# Patient Record
Sex: FEMALE | Race: WHITE | Employment: OTHER | URBAN - NONMETROPOLITAN AREA
[De-identification: names, ages, dates, MRNs, and addresses within clinical notes are randomized per-mention and may not be internally consistent; named-entity substitution may affect disease eponyms.]

---

## 2020-11-02 ENCOUNTER — TELEPHONE (OUTPATIENT)
Dept: FAMILY MEDICINE CLINIC | Age: 65
End: 2020-11-02

## 2020-11-02 NOTE — TELEPHONE ENCOUNTER
Patient called stating her arms are itching and would like you to call in a betamethasone cream, she states it works and Dr Deangelo Lemus use to give it to her. Patient was asked if she had tried any OTC meds like benadryl and she stated she did not want to drug herself up and she knew this cream worked.     LAST OV was 2/2020 for a sick visit

## 2020-11-05 RX ORDER — BETAMETHASONE DIPROPIONATE 0.5 MG/G
CREAM TOPICAL 2 TIMES DAILY
Qty: 90 G | Refills: 2 | Status: SHIPPED | OUTPATIENT
Start: 2020-11-05

## 2020-11-06 ENCOUNTER — OFFICE VISIT (OUTPATIENT)
Dept: FAMILY MEDICINE CLINIC | Age: 65
End: 2020-11-06
Payer: COMMERCIAL

## 2020-11-06 VITALS
OXYGEN SATURATION: 97 % | HEART RATE: 81 BPM | TEMPERATURE: 97.7 F | BODY MASS INDEX: 21.9 KG/M2 | WEIGHT: 116 LBS | HEIGHT: 61 IN

## 2020-11-06 DIAGNOSIS — Z20.822 ENCOUNTER FOR LABORATORY TESTING FOR COVID-19 VIRUS: ICD-10-CM

## 2020-11-06 DIAGNOSIS — R09.81 NASAL CONGESTION: Primary | ICD-10-CM

## 2020-11-06 PROCEDURE — 99213 OFFICE O/P EST LOW 20 MIN: CPT | Performed by: NURSE PRACTITIONER

## 2020-11-06 RX ORDER — ESTRADIOL 0.04 MG/D
FILM, EXTENDED RELEASE TRANSDERMAL
COMMUNITY
Start: 2020-08-25 | End: 2020-12-21

## 2020-11-06 RX ORDER — FLUTICASONE PROPIONATE 50 MCG
SPRAY, SUSPENSION (ML) NASAL
Qty: 1 BOTTLE | Refills: 5 | Status: SHIPPED | OUTPATIENT
Start: 2020-11-06 | End: 2022-08-05 | Stop reason: ALTCHOICE

## 2020-11-06 RX ORDER — CETIRIZINE HCL 10 MG
10 TABLET ORAL
Qty: 30 TAB | Refills: 0 | Status: SHIPPED | OUTPATIENT
Start: 2020-11-06 | End: 2022-08-05 | Stop reason: ALTCHOICE

## 2020-11-06 RX ORDER — METHYLPREDNISOLONE 4 MG
TABLET, DOSE PACK ORAL
COMMUNITY
End: 2022-04-12

## 2020-11-06 RX ORDER — CALCIUM CARB/VITAMIN D3/VIT K1 500-500-40
TABLET,CHEWABLE ORAL
COMMUNITY
End: 2022-08-05 | Stop reason: ALTCHOICE

## 2020-11-06 NOTE — PROGRESS NOTES
HPI    Bessie Valente is a 76 y.o. female and presents today for Headache and Sinus Infection  Patient states that she's traveled to [place] and [place] recently; states she was around her sister (without a mask) who was sick with a hacking cough(sister works at [employer]); she endorses minimal cough, headache, ?sinus pressure, nasal congestion and an overall unwell feeling. Recent Travel Screening and Travel History documentation   Travel Screening     Question   Response    In the last month, have you been in contact with someone who was confirmed or suspected to have Coronavirus / COVID-19? No / Unsure    Have you had a COVID-19 viral test in the last 14 days? No    Do you have any of the following new or worsening symptoms? Have you traveled internationally in the last month? No      Travel History   Travel since 10/06/20     No documented travel since 10/06/20         Screening  phq neg    Allergies    Allergies   Allergen Reactions    Flagyl [Metronidazole] Diarrhea and Nausea and Vomiting        Medications    Current Outpatient Medications   Medication Sig Dispense    estradioL (VIVELLE) 0.0375 mg/24 hr      DOXYLAMINE SUCCINATE PO Take 1 Tab by mouth nightly.  MV,Ca,Ir,Mn-FA-Cho-Gel-Joaquín-Pab (Body, Hair, Skin and Nails) 3-133 mg-mcg cap Body, Hair, Skin and Nails   daily     cholecalciferol, vitamin d3, (Vitamin D3) 10 mcg (400 unit) cap Vitamin D3   5,000 IU daily     fluticasone propionate (FLONASE) 50 mcg/actuation nasal spray 2 sprays each nostril everyday 1 Bottle    cetirizine (ZYRTEC) 10 mg tablet Take 1 Tab by mouth nightly. 30 Tab    betamethasone dipropionate (DIPROSONE) 0.05 % topical cream Apply  to affected area two (2) times a day. 90 g    [place] Thyroid 15 mg tablet TAKE 3 TABLETS DAILY 90 Tab     No current facility-administered medications for this visit. Problem List    There are no active problems to display for this patient.        Vitals    Visit Vitals  Pulse 81   Temp 97.7 °F (36.5 °C)   Ht 5' 1\" (1.549 m)   Wt 116 lb (52.6 kg)   SpO2 97%   BMI 21.92 kg/m²        ROS    Review of Systems   Constitutional: Positive for malaise/fatigue. Negative for chills. HENT: Positive for congestion. Negative for ear pain, sinus pain and sore throat. Eyes: Negative for blurred vision. Respiratory: Positive for cough (minimal). Negative for sputum production, shortness of breath and wheezing. Cardiovascular: Negative for chest pain. Gastrointestinal: Negative for diarrhea, nausea and vomiting. Musculoskeletal: Negative for myalgias. Skin: Negative for rash. Neurological: Positive for headaches. Negative for dizziness and weakness. Endo/Heme/Allergies: Negative for environmental allergies. Physical Exam    Physical Exam  Vitals signs and nursing note reviewed. Constitutional:       General: She is awake. She is not in acute distress. Appearance: Normal appearance. She is not ill-appearing or toxic-appearing. HENT:      Head: Normocephalic and atraumatic. Right Ear: Tympanic membrane, ear canal and external ear normal. There is impacted cerumen. Left Ear: Tympanic membrane, ear canal and external ear normal. There is no impacted cerumen. Nose: Rhinorrhea present. No congestion. Right Turbinates: Pale. Left Turbinates: Pale. Right Sinus: No maxillary sinus tenderness or frontal sinus tenderness. Left Sinus: No maxillary sinus tenderness or frontal sinus tenderness. Mouth/Throat:      Mouth: Mucous membranes are moist.      Pharynx: Oropharynx is clear. No posterior oropharyngeal erythema. Tonsils: No tonsillar exudate. Eyes:      General:         Right eye: No discharge. Left eye: No discharge. Extraocular Movements: Extraocular movements intact. Neck:      Musculoskeletal: Normal range of motion. Cardiovascular:      Rate and Rhythm: Normal rate and regular rhythm. Pulmonary:      Effort: Pulmonary effort is normal. No respiratory distress. Breath sounds: Normal breath sounds. No wheezing or rhonchi. Musculoskeletal: Normal range of motion. Lymphadenopathy:      Cervical: No cervical adenopathy. Skin:     Findings: No rash. Neurological:      General: No focal deficit present. Mental Status: She is alert and oriented to person, place, and time. Psychiatric:         Behavior: Behavior normal.          Assessment/Plan  1. Nasal congestion  I do not think her exam warrants abx at this time; will try flonase and zyrtec as initial tx; for worsening symptoms, call back to my office and we can consider trial of abx  - fluticasone propionate (FLONASE) 50 mcg/actuation nasal spray; 2 sprays each nostril everyday  Dispense: 1 Bottle; Refill: 5  - cetirizine (ZYRTEC) 10 mg tablet; Take 1 Tab by mouth nightly. Dispense: 30 Tab; Refill: 0    2. Encounter for laboratory testing for COVID-19 virus  covid is a possibility given her exposure and recent travel; will check covid test and call with results  Instructed patient to self quarantine; answered all questions regarding this/covid. If Covid testing was performed today, Will call with test results as soon as they are available; recommend otc tylenol prn for fever/pain; also recommend vit D/vit C/zinc daily and melatonin for sleep prn; for worsening symptoms, sob, difficulty breathing or any other concerning symptoms, instructed patient to go to nearest ER   - NOVEL CORONAVIRUS (COVID-19); Future       Reviewed with him/her that COVID-19 pandemic is an evolving situation with rapidly changing recommendations & guidelines. Medical decisions are made based on the the best information available at the time.   Recommended he/she stay tuned for updates published by trusted sources and to advise your PCP of any unexpected changes in clinical condition    Jannie Delacruz, MSN, FNP-BC       This visit was provided as a focused evaluation during the COVID -19 pandemic/national emergency. A comprehensive review of all previous patient history and testing was not conducted. Pertinent findings were elicited during the visit.

## 2020-11-06 NOTE — PROGRESS NOTES
Gill Rome presents today for   Chief Complaint   Patient presents with    Headache    Sinus Infection       Is someone accompanying this pt? No  Is the patient using any DME equipment during OV? No    Depression Screening:  3 most recent PHQ Screens 11/6/2020   Little interest or pleasure in doing things Not at all   Feeling down, depressed, irritable, or hopeless Not at all   Total Score PHQ 2 0       Learning Assessment:  Learning Assessment 11/6/2020   PRIMARY LEARNER Patient   HIGHEST LEVEL OF EDUCATION - PRIMARY LEARNER  2 YEARS OF COLLEGE   BARRIERS PRIMARY LEARNER NONE   CO-LEARNER CAREGIVER No   PRIMARY LANGUAGE ENGLISH   LEARNER PREFERENCE PRIMARY VIDEOS     READING   ANSWERED BY patient   RELATIONSHIP SELF       Abuse Screening:  Abuse Screening Questionnaire 11/6/2020   Do you ever feel afraid of your partner? N   Are you in a relationship with someone who physically or mentally threatens you? N   Is it safe for you to go home? Y       Fall Risk  Fall Risk Assessment, last 12 mths 11/6/2020   Able to walk? Yes   Fall in past 12 months? No       ADL  ADL Assessment 11/6/2020   Feeding yourself No Help Needed   Getting from bed to chair No Help Needed   Getting dressed No Help Needed   Bathing or showering No Help Needed   Walk across the room (includes cane/walker) No Help Needed   Using the telphone No Help Needed   Taking your medications No Help Needed   Preparing meals No Help Needed   Managing money (expenses/bills) No Help Needed   Moderately strenuous housework (laundry) No Help Needed   Shopping for personal items (toiletries/medicines) No Help Needed   Shopping for groceries No Help Needed   Driving No Help Needed   Climbing a flight of stairs No Help Needed   Getting to places beyond walking distances No Help Needed       Health Maintenance reviewed and discussed and ordered per Provider.     Health Maintenance Due   Topic Date Due    Hepatitis C Screening  1955    DTaP/Tdap/Td series (1 - Tdap) 04/25/1975    Lipid Screen  04/25/1994    Shingrix Vaccine Age 50> (1 of 2) 04/25/2004    Breast Cancer Screen Mammogram  04/25/2004    GLAUCOMA SCREENING Q2Y  04/25/2019    Bone Densitometry (Dexa) Screening  04/25/2019    Pneumococcal 65+ years (1 of 1 - PPSV23) 04/25/2019    Flu Vaccine (1) 09/01/2020   . Coordination of Care:  1. Have you been to the ER, urgent care clinic since your last visit? Hospitalized since your last visit? No    2. Have you seen or consulted any other health care providers outside of the [address] [place] [address] since your last visit? Include any pap smears or colon screening.  No

## 2020-11-08 LAB — SARS-COV-2, NAA: NOT DETECTED

## 2020-12-21 RX ORDER — ESTRADIOL 0.04 MG/D
FILM, EXTENDED RELEASE TRANSDERMAL
Qty: 24 PATCH | Refills: 3 | Status: SHIPPED | OUTPATIENT
Start: 2020-12-21 | End: 2021-12-16 | Stop reason: SDUPTHER

## 2021-01-04 ENCOUNTER — TELEPHONE (OUTPATIENT)
Dept: FAMILY MEDICINE CLINIC | Age: 66
End: 2021-01-04

## 2021-02-02 RX ORDER — THYROID, PORCINE 15 MG/1
TABLET ORAL
Qty: 270 TAB | Refills: 0 | Status: SHIPPED | OUTPATIENT
Start: 2021-02-02 | End: 2021-05-03

## 2021-02-08 ENCOUNTER — VIRTUAL VISIT (OUTPATIENT)
Dept: FAMILY MEDICINE CLINIC | Age: 66
End: 2021-02-08
Payer: COMMERCIAL

## 2021-02-08 ENCOUNTER — TELEPHONE (OUTPATIENT)
Dept: FAMILY MEDICINE CLINIC | Age: 66
End: 2021-02-08

## 2021-02-08 DIAGNOSIS — E78.2 MIXED HYPERLIPIDEMIA: ICD-10-CM

## 2021-02-08 DIAGNOSIS — Z79.890 HORMONE REPLACEMENT THERAPY (HRT): ICD-10-CM

## 2021-02-08 DIAGNOSIS — E53.8 VITAMIN B12 DEFICIENCY: ICD-10-CM

## 2021-02-08 DIAGNOSIS — E03.9 ACQUIRED HYPOTHYROIDISM: ICD-10-CM

## 2021-02-08 DIAGNOSIS — E27.9 LESION OF ADRENAL GLAND (HCC): ICD-10-CM

## 2021-02-08 DIAGNOSIS — Z11.59 NEED FOR HEPATITIS C SCREENING TEST: ICD-10-CM

## 2021-02-08 DIAGNOSIS — E55.9 VITAMIN D DEFICIENCY: ICD-10-CM

## 2021-02-08 DIAGNOSIS — Z12.31 ENCOUNTER FOR SCREENING MAMMOGRAM FOR MALIGNANT NEOPLASM OF BREAST: Primary | ICD-10-CM

## 2021-02-08 PROCEDURE — 99443 PR PHYS/QHP TELEPHONE EVALUATION 21-30 MIN: CPT | Performed by: NURSE PRACTITIONER

## 2021-02-08 NOTE — PROGRESS NOTES
Gisele Marinelli presents today for   Chief Complaint   Patient presents with    Follow-up         Depression Screening:  3 most recent PHQ Screens 2/8/2021   Little interest or pleasure in doing things Not at all   Feeling down, depressed, irritable, or hopeless Not at all   Total Score PHQ 2 0       Learning Assessment:  Learning Assessment 11/6/2020   PRIMARY LEARNER Patient   HIGHEST LEVEL OF EDUCATION - PRIMARY LEARNER  2 YEARS OF COLLEGE   BARRIERS PRIMARY LEARNER NONE   CO-LEARNER CAREGIVER No   PRIMARY LANGUAGE ENGLISH   LEARNER PREFERENCE PRIMARY VIDEOS     READING   ANSWERED BY patient   RELATIONSHIP SELF       Fall Risk  Fall Risk Assessment, last 12 mths 2/8/2021   Able to walk? Yes   Fall in past 12 months? 0   Do you feel unsteady? 0   Are you worried about falling 0       Health Maintenance reviewed and discussed and ordered per Provider. Health Maintenance Due   Topic Date Due    Hepatitis C Screening  1955    COVID-19 Vaccine (1 of 2) 04/25/1970    DTaP/Tdap/Td series (1 - Tdap) 04/25/1975    Lipid Screen  04/25/1994    Shingrix Vaccine Age 50> (1 of 2) 04/25/2004    Breast Cancer Screen Mammogram  04/25/2004    GLAUCOMA SCREENING Q2Y  04/25/2019    Bone Densitometry (Dexa) Screening  04/25/2019    Pneumococcal 65+ years (1 of 1 - PPSV23) 04/25/2019    Flu Vaccine (1) 09/01/2020   . Coordination of Care:  1. Have you been to the ER, urgent care clinic since your last visit? Hospitalized since your last visit? No    2. Have you seen or consulted any other health care providers outside of the [address] [place] [address] since your last visit? Include any pap smears or colon screening.  No

## 2021-02-08 NOTE — PROGRESS NOTES
Pursuant to the emergency declaration under the [facility] waiver authority and the [employer] Act, this phone visit was conducted, with patient's consent, to reduce the patient's risk of exposure to COVID-19 and provide continuity of care for an established patient. She and/or health care decision maker is aware that that she may receive a bill for this telephone service, depending on her insurance coverage, and has provided verbal consent to proceed. This is a Patient Initiated Episode with an Established Patient who has not had a related appointment within my department in the past 7 days or scheduled within the next 24 hours. HISTORY OF PRESENTING ILLNESS      Ariel Reyes is a 76 y.o. female evaluated via telephone on 2/8/2021 due to COVID 19 restrictions. Patient unable to participate in Virtual Visit with synchronous audio/visual technology. Patient presents today via telephone for routine follow-up she has medical history significant for thyroidism as well as on estradiol HRT. Patient complains of roughly 3 to 6 pound weight loss over the past year she expresses concern that she is unsure if she has a and \"adrenal insufficiency \". Patient had incidental finding of 8 mm low-density adenoma to left adrenal discovered CT without contrast July 21, 2019 inadvertently she had had repeat CT the abdomen pelvis with contrast that did not show any detectable lesion thereafter. She goes on to say that she is under increasing amounts of stress.     PCP Provider  Falguni Sanchez NP  Past Medical History:   Diagnosis Date    Thyroid disease       Past Surgical History:   Procedure Laterality Date    HX COLECTOMY       Allergies   Allergen Reactions    Flagyl [Metronidazole] Diarrhea and Nausea and Vomiting      Family History   Problem Relation Age of Onset  Lung Disease Mother     Heart Attack Father     Heart Attack Brother       Current Outpatient Medications   Medication Sig    APPLE CIDER VINEGAR PO Take  by mouth.  KRILL OIL PO Take  by mouth.  [place] Thyroid 15 mg tablet TAKE 3 TABLETS DAILY    estradioL (VIVELLE) 0.0375 mg/24 hr APPLY TWO TIMES A WEEK    DOXYLAMINE SUCCINATE PO Take 1 Tab by mouth nightly.  MV,Ca,Ir,Mn-FA-Cho-Gel-Joaquín-Pab (Body, Hair, Skin and Nails) 3-133 mg-mcg cap Body, Hair, Skin and Nails   daily    cholecalciferol, vitamin d3, (Vitamin D3) 10 mcg (400 unit) cap Vitamin D3   5,000 IU daily    fluticasone propionate (FLONASE) 50 mcg/actuation nasal spray 2 sprays each nostril everyday    cetirizine (ZYRTEC) 10 mg tablet Take 1 Tab by mouth nightly.  betamethasone dipropionate (DIPROSONE) 0.05 % topical cream Apply  to affected area two (2) times a day. No current facility-administered medications for this visit. There were no vitals filed for this visit.   Social History     Socioeconomic History    Marital status:      Spouse name: Not on file    Number of children: Not on file    Years of education: Not on file    Highest education level: Not on file   Occupational History    Not on file   Social Needs    Financial resource strain: Not on file    Food insecurity     Worry: Not on file     Inability: Not on file    Transportation needs     Medical: Not on file     Non-medical: Not on file   Tobacco Use    Smoking status: Never Smoker    Smokeless tobacco: Never Used   Substance and Sexual Activity    Alcohol use: Yes     Frequency: 4 or more times a week     Drinks per session: 1 or 2     Binge frequency: Never    Drug use: Never    Sexual activity: Not on file   Lifestyle    Physical activity     Days per week: Not on file     Minutes per session: Not on file    Stress: Not on file   Relationships    Social connections     Talks on phone: Not on file     Gets together: Not on file Attends Hindu service: Not on file     Active member of club or organization: Not on file     Attends meetings of clubs or organizations: Not on file     Relationship status: Not on file    Intimate partner violence     Fear of current or ex partner: Not on file     Emotionally abused: Not on file     Physically abused: Not on file     Forced sexual activity: Not on file   Other Topics Concern    Not on file   Social History Narrative    Not on file       MEDICATIONS     Current Outpatient Medications   Medication Sig    APPLE CIDER VINEGAR PO Take  by mouth.  KRILL OIL PO Take  by mouth.  [place] Thyroid 15 mg tablet TAKE 3 TABLETS DAILY    estradioL (VIVELLE) 0.0375 mg/24 hr APPLY TWO TIMES A WEEK    DOXYLAMINE SUCCINATE PO Take 1 Tab by mouth nightly.  MV,Ca,Ir,Mn-FA-Cho-Gel-Joaquín-Pab (Body, Hair, Skin and Nails) 3-133 mg-mcg cap Body, Hair, Skin and Nails   daily    cholecalciferol, vitamin d3, (Vitamin D3) 10 mcg (400 unit) cap Vitamin D3   5,000 IU daily    fluticasone propionate (FLONASE) 50 mcg/actuation nasal spray 2 sprays each nostril everyday    cetirizine (ZYRTEC) 10 mg tablet Take 1 Tab by mouth nightly.  betamethasone dipropionate (DIPROSONE) 0.05 % topical cream Apply  to affected area two (2) times a day. No current facility-administered medications for this visit. I have reviewed the nurses notes, vitals, problem list, allergy list, medical history, family, social history and medications. REVIEW OF SYMPTOMS     General: Pt denies excessive weight gain or loss. Pt is able to conduct ADL's  HEENT: Denies blurred vision, headaches, hearing loss, epistaxis and difficulty swallowing. Respiratory: Denies cough, congestion, shortness of breath, ZHAO, wheezing or stridor.   Cardiovascular: Denies precordial pain, palpitations, edema or PND  Gastrointestinal: Denies poor appetite, indigestion, abdominal pain or blood in stool  Genitourinary: Denies hematuria, dysuria, increased urinary frequency  Musculoskeletal: Denies joint pain or swelling from muscles or joints  Neurologic: Denies tremor, paresthesias, headache, or sensory motor disturbance  Psychiatric: Denies confusion, insomnia, depression  Integumentray: Denies rash, itching or ulcers. Hematologic: Denies easy bruising, bleeding       PHYSICAL EXAM       Due to this being a telephone encounter a very limited exam was performed  Neurological: A&Ox3, no slurred speech, answering questions appropriately  Respiratory: Non labored, talking in complete sentences, no audible wheeze over the phone        ASSESSMENT        Diagnoses and all orders for this visit:    1. Encounter for screening mammogram for malignant neoplasm of breast  -     [facility]HE[facility] MAMMO BI SCREENING INCL CAD; Future    2. Acquired hypothyroidism  -     TSH 3RD GENERATION; Future  -     T4, FREE; Future  -Labs today any changes to current treatment contingent upon those findings    3. Lesion of adrenal gland (HCC)  -     METABOLIC PANEL, COMPREHENSIVE; Future  -     CORTISOL, AM; Future  -Labs today any changes to current treatment contingent upon those findings    4. Vitamin B12 deficiency  -     CBC W/O DIFF; Future  -     VITAMIN B12; Future  -Labs today any changes to current treatment contingent upon those findings    5. Vitamin D deficiency  -     VITAMIN D, 25 HYDROXY; Future  -Labs today any changes to current treatment contingent upon those findings    6. Need for hepatitis C screening test  -     HEPATITIS C AB; Future  -Labs today any changes to current treatment contingent upon those findings    7. Mixed hyperlipidemia  -     LIPID PANEL; Future  -Labs today any changes to current treatment contingent upon those findings    8. Hormone replacement therapy (HRT)  -     ESTRADIOL; Future    -Labs today any changes to current treatment contingent upon those findings      ICD-10-CM ICD-9-CM    1.  Encounter for screening mammogram for malignant neoplasm of breast  Z12.31 V76.12 [facility] MAMMO BI SCREENING INCL CAD   2. Acquired hypothyroidism  E03.9 244.9 TSH 3RD GENERATION      T4, FREE   3. Lesion of adrenal gland (HCC)  T92.9 796.7 METABOLIC PANEL, COMPREHENSIVE      CORTISOL, AM   4. Vitamin B12 deficiency  E53.8 266.2 CBC W/O DIFF      VITAMIN B12   5. Vitamin D deficiency  E55.9 268.9 VITAMIN D, 25 HYDROXY   6. Need for hepatitis C screening test  Z11.59 V73.89 HEPATITIS C AB   7. Mixed hyperlipidemia  E78.2 272.2 LIPID PANEL   8. Hormone replacement therapy (HRT)  Z79.890 V07.4 ESTRADIOL      ESTRADIOL     Orders Placed This Encounter    [facility] MAMMO BI SCREENING INCL CAD ([id number])     Standing Status:   Future     Standing Expiration Date:   2/8/2022     Order Specific Question:   Reason for Exam     Answer:   Screening    CBC W/O DIFF     Standing Status:   Future     Standing Expiration Date:   2/9/2022    LIPID PANEL     Standing Status:   Future     Standing Expiration Date:   7/7/7572    METABOLIC PANEL, COMPREHENSIVE     Standing Status:   Future     Standing Expiration Date:   2/9/2022    VITAMIN B12     Standing Status:   Future     Standing Expiration Date:   2/9/2022    VITAMIN D, 25 HYDROXY     Standing Status:   Future     Standing Expiration Date:   2/9/2022    TSH 3RD GENERATION     Standing Status:   Future     Standing Expiration Date:   2/9/2022    HEPATITIS C AB     Standing Status:   Future     Standing Expiration Date:   2/9/2022    T4, FREE     Standing Status:   Future     Standing Expiration Date:   2/9/2022    CORTISOL, AM     Standing Status:   Future     Standing Expiration Date:   2/9/2022    ESTRADIOL     Standing Status:   Future     Standing Expiration Date:   2/9/2022    ESTRADIOL     Standing Status:   Future     Standing Expiration Date:   2/9/2022    APPLE CIDER VINEGAR PO     Sig: Take  by mouth.  KRILL OIL PO     Sig: Take  by mouth.         PLAN       TIME 25 (Minutes) SPENT RELATED TO THIS PHONE ENCOUNTER    We discussed the expected course, resolution and complications of the diagnosis(es) in detail. Medication risks, benefits, costs, interactions, and alternatives were discussed as indicated. I advised her to contact the office if her condition worsens, changes or fails to improve as anticipated.  She expressed understanding with the diagnosis(es) and plan

## 2021-02-12 DIAGNOSIS — Z12.31 ENCOUNTER FOR SCREENING MAMMOGRAM FOR MALIGNANT NEOPLASM OF BREAST: ICD-10-CM

## 2021-03-08 RX ORDER — ATORVASTATIN CALCIUM 40 MG/1
40 TABLET, FILM COATED ORAL DAILY
Qty: 90 TAB | Refills: 2 | Status: SHIPPED | OUTPATIENT
Start: 2021-03-08 | End: 2022-02-14

## 2021-03-11 DIAGNOSIS — E03.9 ACQUIRED HYPOTHYROIDISM: Primary | ICD-10-CM

## 2021-05-03 RX ORDER — THYROID, PORCINE 15 MG/1
TABLET ORAL
Qty: 270 TAB | Refills: 0 | Status: SHIPPED | OUTPATIENT
Start: 2021-05-03 | End: 2021-07-27

## 2021-07-27 RX ORDER — THYROID, PORCINE 15 MG/1
TABLET ORAL
Qty: 270 TABLET | Refills: 0 | Status: SHIPPED | OUTPATIENT
Start: 2021-07-27 | End: 2021-10-18

## 2021-08-23 ENCOUNTER — OFFICE VISIT (OUTPATIENT)
Dept: FAMILY MEDICINE CLINIC | Age: 66
End: 2021-08-23
Payer: COMMERCIAL

## 2021-08-23 VITALS
HEART RATE: 71 BPM | BODY MASS INDEX: 21.6 KG/M2 | DIASTOLIC BLOOD PRESSURE: 64 MMHG | OXYGEN SATURATION: 99 % | SYSTOLIC BLOOD PRESSURE: 112 MMHG | HEIGHT: 61 IN | WEIGHT: 114.4 LBS

## 2021-08-23 DIAGNOSIS — E78.2 MIXED HYPERLIPIDEMIA: ICD-10-CM

## 2021-08-23 DIAGNOSIS — F41.9 ANXIETY: ICD-10-CM

## 2021-08-23 DIAGNOSIS — Z79.890 HORMONE REPLACEMENT THERAPY (HRT): ICD-10-CM

## 2021-08-23 DIAGNOSIS — E03.9 ACQUIRED HYPOTHYROIDISM: Primary | ICD-10-CM

## 2021-08-23 PROCEDURE — 99214 OFFICE O/P EST MOD 30 MIN: CPT | Performed by: NURSE PRACTITIONER

## 2021-08-23 RX ORDER — DESVENLAFAXINE 25 MG/1
TABLET, EXTENDED RELEASE ORAL
Qty: 60 TABLET | Refills: 1 | Status: SHIPPED | OUTPATIENT
Start: 2021-08-23 | End: 2022-04-12

## 2021-08-23 NOTE — PROGRESS NOTES
Yuri Craven presents today for No chief complaint on file. Is someone accompanying this pt? no    Is the patient using any DME equipment during [address]? no    Depression Screening:  3 most recent PHQ Screens 8/23/2021   Little interest or pleasure in doing things Not at all   Feeling down, depressed, irritable, or hopeless Not at all   Total Score PHQ 2 0       Learning Assessment:  Learning Assessment 11/6/2020   PRIMARY LEARNER Patient   HIGHEST LEVEL OF EDUCATION - PRIMARY LEARNER  2 YEARS OF COLLEGE   BARRIERS PRIMARY LEARNER NONE   CO-LEARNER CAREGIVER No   PRIMARY LANGUAGE ENGLISH   LEARNER PREFERENCE PRIMARY VIDEOS     READING   ANSWERED BY patient   RELATIONSHIP SELF       Fall Risk  Fall Risk Assessment, last 12 mths 8/23/2021   Able to walk? Yes   Fall in past 12 months? 0   Do you feel unsteady? 0   Are you worried about falling 0       Health Maintenance reviewed and discussed and ordered per Provider. Health Maintenance Due   Topic Date Due    Hepatitis C Screening  Never done    DTaP/Tdap/Td series (1 - Tdap) Never done    Shingrix Vaccine Age 50> (1 of 2) Never done    Bone Densitometry (Dexa) Screening  Never done    Pneumococcal 65+ years (2 of 2 - PPSV23) 11/08/2020    COVID-19 Vaccine (2 - Moderna 2-dose series) 02/18/2021   . Coordination of Care:  1. Have you been to the ER, urgent care clinic since your last visit? Hospitalized since your last visit? no    2. Have you seen or consulted any other health care providers outside of the [address] [place] [address] since your last visit? Include any pap smears or colon screening.  no

## 2021-08-23 NOTE — PROGRESS NOTES
Augusto Dewitt is a 78 y. o.female presents with   No chief complaint on file. 21-year-old female presents today in office for routine follow-up for mixed hyperlipidemia hormone replacement therapy as well as hypothyroidism. Patient states she is tolerating all medications well. She does complain of increasing feelings of \"anxiety and uneasiness \"each morning when awakening. Patient does relay that she is living in a highly stressful environment. She denies any homicidal or suicidal ideation. Subjective:           Past Medical History:   Diagnosis Date    Thyroid disease      Past Surgical History:   Procedure Laterality Date    HX TOTAL COLECTOMY       Social History     Socioeconomic History    Marital status:      Spouse name: Not on file    Number of children: Not on file    Years of education: Not on file    Highest education level: Not on file   Tobacco Use    Smoking status: Never Smoker    Smokeless tobacco: Never Used   Vaping Use    Vaping Use: Never used   Substance and Sexual Activity    Alcohol use: Yes     Alcohol/week: 0.0 - 14.0 standard drinks    Drug use: Never     Social Determinants of Health     Financial Resource Strain:     Difficulty of Paying Living Expenses:    Food Insecurity:     Worried About Running Out of Food in the Last Year:     [address] in the Last Year:    Transportation Needs:     Lack of Transportation (Medical):      Lack of Transportation (Non-Medical):    Physical Activity:     Days of Exercise per Week:     Minutes of Exercise per Session:    Stress:     Feeling of Stress :    Social Connections:     Frequency of Communication with Friends and Family:     Frequency of Social Gatherings with Friends and Family:     Attends [employer] Services:     Active Member of Clubs or Organizations:     Attends Club or Organization Meetings:     Marital Status:      Current Outpatient Medications   Medication Sig Dispense Refill    desvenlafaxine succinate (Pristiq) 25 mg ER tablet 1 tab po once daily x 7 days then increase to 2 tabs po once daily thereafter 60 Tablet 1    [place] Thyroid 15 mg tablet TAKE 3 TABLETS DAILY 270 Tablet 0    atorvastatin (LIPITOR) 40 mg tablet Take 1 Tab by mouth daily. 90 Tab 2    estradioL (VIVELLE) 0.0375 mg/24 hr APPLY TWO TIMES A WEEK 24 Patch 3    DOXYLAMINE SUCCINATE PO Take 1 Tab by mouth nightly.  MV,Ca,Ir,Mn-FA-Cho-Gel-Joaquín-Pab (Body, Hair, Skin and Nails) 3-133 mg-mcg cap Body, Hair, Skin and Nails   daily      cholecalciferol, vitamin d3, (Vitamin D3) 10 mcg (400 unit) cap Vitamin D3   5,000 IU daily      betamethasone dipropionate (DIPROSONE) 0.05 % topical cream Apply  to affected area two (2) times a day. 90 g 2    APPLE CIDER VINEGAR PO Take  by mouth. (Patient not taking: Reported on 8/23/2021)      KRILL OIL PO Take  by mouth. (Patient not taking: Reported on 8/23/2021)      fluticasone propionate (FLONASE) 50 mcg/actuation nasal spray 2 sprays each nostril everyday (Patient not taking: Reported on 8/23/2021) 1 Bottle 5    cetirizine (ZYRTEC) 10 mg tablet Take 1 Tab by mouth nightly. (Patient taking differently: Take 10 mg by mouth as needed.) 30 Tab 0     Allergies   Allergen Reactions    Flagyl [Metronidazole] Diarrhea and Nausea and Vomiting     The patient has a family history of    REVIEW OF SYSTEMS  Review of Systems   Constitutional: Negative for chills and fever. HENT: Negative for ear discharge, ear pain, hearing loss, sinus pain and sore throat. Eyes: Negative for pain. Respiratory: Negative for cough and shortness of breath. Cardiovascular: Negative for chest pain, palpitations and leg swelling. Gastrointestinal: Negative for abdominal pain, nausea and vomiting. Genitourinary: Negative for dysuria, frequency and urgency. Musculoskeletal: Negative for falls, myalgias and neck pain. Skin: Negative for rash.    Neurological: Negative for dizziness, tingling, tremors and headaches. Psychiatric/Behavioral: Negative for depression, substance abuse and suicidal ideas. The patient is nervous/anxious. The patient does not have insomnia. Objective:     Visit Vitals  /64 (BP 1 Location: Left upper arm, BP Patient Position: Sitting, BP Cuff Size: Adult)   Pulse 71   Ht 5' 1\" (1.549 m)   Wt 114 lb 6.4 oz (51.9 kg)   SpO2 99%   BMI 21.62 kg/m²       Current Outpatient Medications   Medication Instructions    APPLE CIDER VINEGAR PO Take  by mouth.  [place] Thyroid 15 mg tablet TAKE 3 TABLETS DAILY    atorvastatin (LIPITOR) 40 mg, Oral, DAILY    betamethasone dipropionate (DIPROSONE) 0.05 % topical cream Topical, 2 TIMES DAILY    cetirizine (ZYRTEC) 10 mg, Oral, EVERY BEDTIME    cholecalciferol, vitamin d3, (Vitamin D3) 10 mcg (400 unit) cap Vitamin D3   5,000 IU daily    desvenlafaxine succinate (Pristiq) 25 mg ER tablet 1 tab po once daily x 7 days then increase to 2 tabs po once daily thereafter    DOXYLAMINE SUCCINATE PO 1 Tablet, Oral, EVERY BEDTIME    estradioL (VIVELLE) 0.0375 mg/24 hr APPLY TWO TIMES A WEEK    fluticasone propionate (FLONASE) 50 mcg/actuation nasal spray 2 sprays each nostril everyday    KRILL OIL PO Take  by mouth.  MV,Ca,Ir,Mn-FA-Cho-Gel-Joaquín-Pab (Body, Hair, Skin and Nails) 3-133 mg-mcg cap Body, Hair, Skin and Nails   daily        PHYSICAL EXAM  Physical Exam  Constitutional:       Appearance: Normal appearance. Cardiovascular:      Heart sounds: Normal heart sounds. Pulmonary:      Breath sounds: Normal breath sounds. Musculoskeletal:      Right lower leg: No edema. Left lower leg: No edema. Skin:     General: Skin is warm and dry. Neurological:      Mental Status: She is alert and oriented to person, place, and time. Psychiatric:         Mood and Affect: Mood normal.         Behavior: Behavior normal.         Thought Content:  Thought content normal.         Judgment: Judgment normal. Assessment/Plan:     Diagnoses and all orders for this visit:    1. Acquired hypothyroidism  -     TSH 3RD GENERATION  -     T3, FREE  -     T4, FREE    2. Mixed hyperlipidemia  -     LIPID PANEL  -     METABOLIC PANEL, COMPREHENSIVE  -Labs today any changes to current treatment contingent upon those findings    3. Anxiety  Patient is agreeable to starting daily Pristiq she will follow-up with me in 1 month or sooner as needed. I did advise the patient     If at any time you feel unsafe and may hurt yourself or others, either call '911' or go to the nearest emergency room. Contact the nearest Hospital Emergency Room in cases that result in a medical emergency. Contact a friend / family member to discuss what you are feeling and solicit support. 4. Hormone replacement therapy (HRT)  The current medical regimen is effective;  continue present plan and medications. Other orders  -     desvenlafaxine succinate (Pristiq) 25 mg ER tablet; 1 tab po once daily x 7 days then increase to 2 tabs po once daily thereafter        Follow-up and Dispositions    · Return in about 1 month (around 9/23/2021) for virtual, f/u dep/anxiety med. Disclaimer:    I have discussed the diagnosis with the patient and the intended plan as seen above. The patient understands our medical plan. The risks, benefits and significant side effects of all medications have been reviewed. Anticipated time course and progression of condition reviewed. All questions have been addressed. She received an after visit summary, with information reviewed, and questions answered. Where appropriate, she is instructed to call the clinic if she has not been notified either by phone or through [address] with the results of her tests or with an appointment plan for any referrals within 1 week(s). The patient  is to call if her condition worsens or fails to improve or if significant side effects are experienced.        Jesi Pacheco NP

## 2021-09-01 ENCOUNTER — TELEPHONE (OUTPATIENT)
Dept: FAMILY MEDICINE CLINIC | Age: 66
End: 2021-09-01

## 2021-09-01 RX ORDER — DESVENLAFAXINE SUCCINATE 50 MG/1
50 TABLET, EXTENDED RELEASE ORAL DAILY
Qty: 30 TABLET | Refills: 1 | Status: SHIPPED | OUTPATIENT
Start: 2021-09-01 | End: 2022-04-12

## 2021-09-01 NOTE — TELEPHONE ENCOUNTER
When you get a chance, could you call [facility] about pt's Pristiq. [phone]. They said that insurance won't cover 2 tabs of 25 mg and wanted to know if it could be changed to 1 tab of 50 mg.

## 2021-09-08 ENCOUNTER — TELEPHONE (OUTPATIENT)
Dept: FAMILY MEDICINE CLINIC | Age: 66
End: 2021-09-08

## 2021-09-09 NOTE — TELEPHONE ENCOUNTER
Left detailed message for patient regarding the letter I sent her on MyChart that her lab results were within normal limits per [facility].

## 2021-10-18 ENCOUNTER — VIRTUAL VISIT (OUTPATIENT)
Dept: FAMILY MEDICINE CLINIC | Age: 66
End: 2021-10-18
Payer: COMMERCIAL

## 2021-10-18 ENCOUNTER — TELEPHONE (OUTPATIENT)
Dept: FAMILY MEDICINE CLINIC | Age: 66
End: 2021-10-18

## 2021-10-18 DIAGNOSIS — M62.08 DIASTASIS OF RECTUS ABDOMINIS: Primary | ICD-10-CM

## 2021-10-18 DIAGNOSIS — F41.9 ANXIETY: ICD-10-CM

## 2021-10-18 PROCEDURE — 99442 PR PHYS/QHP TELEPHONE EVALUATION 11-20 MIN: CPT | Performed by: NURSE PRACTITIONER

## 2021-10-18 RX ORDER — TRETINOIN 1 MG/G
CREAM TOPICAL
COMMUNITY
Start: 2021-08-25

## 2021-10-18 RX ORDER — THYROID, PORCINE 15 MG/1
TABLET ORAL
Qty: 270 TABLET | Refills: 0 | Status: SHIPPED | OUTPATIENT
Start: 2021-10-18 | End: 2022-01-10 | Stop reason: SDUPTHER

## 2021-10-18 RX ORDER — CONJUGATED ESTROGENS 0.62 MG/G
CREAM VAGINAL
COMMUNITY

## 2021-10-18 RX ORDER — CHOLECALCIFEROL (VITAMIN D3) 125 MCG
2.5 CAPSULE ORAL
COMMUNITY
End: 2022-04-12

## 2021-10-18 NOTE — PROGRESS NOTES
Jessi Madrigal presents today for   Chief Complaint   Patient presents with    Follow-up     Medication followup for depression/anxiety        Virtual/telephone visit    Depression Screening:  3 most recent PHQ Screens 8/23/2021   Little interest or pleasure in doing things Not at all   Feeling down, depressed, irritable, or hopeless Not at all   Total Score PHQ 2 0       Learning Assessment:  Learning Assessment 11/6/2020   PRIMARY LEARNER Patient   HIGHEST LEVEL OF EDUCATION - PRIMARY LEARNER  2 YEARS Mariusz PRIMARY LEARNER NONE   CO-LEARNER CAREGIVER No   PRIMARY LANGUAGE ENGLISH   LEARNER PREFERENCE PRIMARY VIDEOS     READING   ANSWERED BY patient   RELATIONSHIP SELF       Abuse Screening:  Abuse Screening Questionnaire 2/8/2021   Do you ever feel afraid of your partner? N   Are you in a relationship with someone who physically or mentally threatens you? N   Is it safe for you to go home? Y       Fall Risk  Fall Risk Assessment, last 12 mths 8/23/2021   Able to walk? Yes   Fall in past 12 months? 0   Do you feel unsteady? 0   Are you worried about falling 0       ADL  ADL Assessment 11/6/2020   Feeding yourself No Help Needed   Getting from bed to chair No Help Needed   Getting dressed No Help Needed   Bathing or showering No Help Needed   Walk across the room (includes cane/walker) No Help Needed   Using the telphone No Help Needed   Taking your medications No Help Needed   Preparing meals No Help Needed   Managing money (expenses/bills) No Help Needed   Moderately strenuous housework (laundry) No Help Needed   Shopping for personal items (toiletries/medicines) No Help Needed   Shopping for groceries No Help Needed   Driving No Help Needed   Climbing a flight of stairs No Help Needed   Getting to places beyond walking distances No Help Needed       Health Maintenance reviewed and discussed and ordered per Provider.     Health Maintenance Due   Topic Date Due    Hepatitis C Screening  Never done    DTaP/Tdap/Td series (1 - Tdap) Never done    Shingrix Vaccine Age 50> (1 of 2) Never done    Bone Densitometry (Dexa) Screening  Never done    Pneumococcal 65+ years (2 of 2 - PPSV23) 11/08/2020    Flu Vaccine (1) 09/01/2021   . Coordination of Care:  1. Have you been to the ER, urgent care clinic since your last visit? Hospitalized since your last visit? no    2. Have you seen or consulted any other health care providers outside of the [address] [place] [address] since your last visit? Include any pap smears or colon screening.  no

## 2021-10-18 NOTE — PROGRESS NOTES
Pursuant to the emergency declaration under the [facility], [facility] waiver authority and the [employer] Act, this phone visit was conducted, with patient's consent, to reduce the patient's risk of exposure to COVID-19 and provide continuity of care for an established patient. She and/or health care decision maker is aware that that she may receive a bill for this telephone service, depending on her insurance coverage, and has provided verbal consent to proceed. This is a Patient Initiated Episode with an Established Patient who has not had a related appointment within my department in the past 7 days or scheduled within the next 24 hours. HISTORY OF PRESENTING ILLNESS      Edda Bullock is a 78 y.o. female evaluated via telephone on 10/18/2021 due to COVID 19 restrictions. Patient unable to participate in Virtual Visit with synchronous audio/visual technology. Patient presents today via telephone for 1 month follow-up related to starting Pristiq for anxiety. Patient states she has not yet started the medication as she was concerned about a possible adverse reaction and she states she has had adverse reaction to Effexor in the past.  Patient also requests referral for physical therapy as she states that her gastro recently diagnosed her with diastases recti.     PCP Provider  Liliana Collins NP  Past Medical History:   Diagnosis Date    Thyroid disease       Past Surgical History:   Procedure Laterality Date    HX TOTAL COLECTOMY       Allergies   Allergen Reactions    Effexor [Venlafaxine] Other (comments)     Severe headaches     Metronidazole Diarrhea, Nausea and Vomiting and Nausea Only     Pt states she gets N/V and diarrhea with IV administration        Family History   Problem Relation Age of Onset    Lung Disease Mother     Heart Attack Father     Heart Attack Brother       Current Outpatient Medications   Medication Sig    [place] Thyroid 15 mg tablet TAKE 3 TABLETS DAILY    tretinoin (RETIN-A) 0.1 % topical cream APPLY CREAM TO AFFECTED AREA ONCE EVERY NIGHT AT BEDTIME AS TOLERATED    conjugated estrogens (Premarin) 0.625 mg/gram vaginal cream Premarin 0.625 mg/gram vaginal cream    atorvastatin (LIPITOR) 40 mg tablet Take 1 Tab by mouth daily.  estradioL (VIVELLE) 0.0375 mg/24 hr APPLY TWO TIMES A WEEK    DOXYLAMINE SUCCINATE PO Take 1 Tab by mouth nightly.  MV,Ca,Ir,Mn-FA-Cho-Gel-Joaquín-Pab (Body, Hair, Skin and Nails) 3-133 mg-mcg cap Body, Hair, Skin and Nails   daily    cholecalciferol, vitamin d3, (Vitamin D3) 10 mcg (400 unit) cap Vitamin D3   5,000 IU daily    fluticasone propionate (FLONASE) 50 mcg/actuation nasal spray 2 sprays each nostril everyday    cetirizine (ZYRTEC) 10 mg tablet Take 1 Tab by mouth nightly. (Patient taking differently: Take 10 mg by mouth as needed.)    betamethasone dipropionate (DIPROSONE) 0.05 % topical cream Apply  to affected area two (2) times a day.  melatonin 5 mg tablet Take 2.5 mg by mouth nightly. (Patient not taking: Reported on 10/18/2021)    desvenlafaxine succinate (Pristiq) 50 mg ER tablet Take 1 Tablet by mouth daily. (Patient not taking: Reported on 10/18/2021)    desvenlafaxine succinate (Pristiq) 25 mg ER tablet 1 tab po once daily x 7 days then increase to 2 tabs po once daily thereafter (Patient not taking: Reported on 10/18/2021)    APPLE CIDER VINEGAR PO Take  by mouth. (Patient not taking: Reported on 8/23/2021)    KRILL OIL PO Take  by mouth. (Patient not taking: Reported on 8/23/2021)     No current facility-administered medications for this visit. There were no vitals filed for this visit.   Social History     Socioeconomic History    Marital status:      Spouse name: Not on file    Number of children: Not on file    Years of education: Not on file    Highest education level: Not on file   Occupational History    Not on file   Tobacco Use    Smoking status: Never Smoker    Smokeless tobacco: Never Used   Vaping Use    Vaping Use: Never used   Substance and Sexual Activity    Alcohol use: Yes     Alcohol/week: 0.0 - 14.0 standard drinks    Drug use: Never    Sexual activity: Not on file   Other Topics Concern    Not on file   Social History Narrative    Not on file     Social Determinants of Health     Financial Resource Strain:     Difficulty of Paying Living Expenses:    Food Insecurity:     Worried About Running Out of Food in the Last Year:     [address] in the Last Year:    Transportation Needs:     Lack of Transportation (Medical):  Lack of Transportation (Non-Medical):    Physical Activity:     Days of Exercise per Week:     Minutes of Exercise per Session:    Stress:     Feeling of Stress :    Social Connections:     Frequency of Communication with Friends and Family:     Frequency of Social Gatherings with Friends and Family:     Attends [employer] Services:     Active Member of Clubs or Organizations:     Attends Club or Organization Meetings:     Marital Status:    Intimate Partner Violence:     Fear of Current or Ex-Partner:     Emotionally Abused:     Physically Abused:     Sexually Abused:        MEDICATIONS     Current Outpatient Medications   Medication Sig    [place] Thyroid 15 mg tablet TAKE 3 TABLETS DAILY    tretinoin (RETIN-A) 0.1 % topical cream APPLY CREAM TO AFFECTED AREA ONCE EVERY NIGHT AT BEDTIME AS TOLERATED    conjugated estrogens (Premarin) 0.625 mg/gram vaginal cream Premarin 0.625 mg/gram vaginal cream    atorvastatin (LIPITOR) 40 mg tablet Take 1 Tab by mouth daily.  estradioL (VIVELLE) 0.0375 mg/24 hr APPLY TWO TIMES A WEEK    DOXYLAMINE SUCCINATE PO Take 1 Tab by mouth nightly.     MV,Ca,Ir,Mn-FA-Cho-Gel-Joaquín-Pab (Body, Hair, Skin and Nails) 3-133 mg-mcg cap Body, Hair, Skin and Nails   daily    cholecalciferol, vitamin d3, (Vitamin D3) 10 mcg (400 unit) cap Vitamin D3   5,000 IU daily    fluticasone propionate (FLONASE) 50 mcg/actuation nasal spray 2 sprays each nostril everyday    cetirizine (ZYRTEC) 10 mg tablet Take 1 Tab by mouth nightly. (Patient taking differently: Take 10 mg by mouth as needed.)    betamethasone dipropionate (DIPROSONE) 0.05 % topical cream Apply  to affected area two (2) times a day.  melatonin 5 mg tablet Take 2.5 mg by mouth nightly. (Patient not taking: Reported on 10/18/2021)    desvenlafaxine succinate (Pristiq) 50 mg ER tablet Take 1 Tablet by mouth daily. (Patient not taking: Reported on 10/18/2021)    desvenlafaxine succinate (Pristiq) 25 mg ER tablet 1 tab po once daily x 7 days then increase to 2 tabs po once daily thereafter (Patient not taking: Reported on 10/18/2021)    APPLE CIDER VINEGAR PO Take  by mouth. (Patient not taking: Reported on 8/23/2021)    KRILL OIL PO Take  by mouth. (Patient not taking: Reported on 8/23/2021)     No current facility-administered medications for this visit. I have reviewed the nurses notes, vitals, problem list, allergy list, medical history, family, social history and medications. REVIEW OF SYMPTOMS     General: Pt denies excessive weight gain or loss. Pt is able to conduct ADL's         PHYSICAL EXAM       Due to this being a telephone encounter a very limited exam was performed  Neurological: A&Ox3, no slurred speech, answering questions appropriately  Respiratory: Non labored, talking in complete sentences, no audible wheeze over the phone        ASSESSMENT        Diagnoses and all orders for this visit:    1. Diastasis of rectus abdominis  -     REFERRAL TO PHYSICAL THERAPY    2. Anxiety        ICD-10-CM ICD-9-CM    1. Diastasis of rectus abdominis  M62.08 728.84 REFERRAL TO PHYSICAL THERAPY   2.  Anxiety  F41.9 300.00      Orders Placed This Encounter    REFERRAL TO PHYSICAL THERAPY     Referral Priority: Routine     Referral Type:   PT/OT/ST     Referral Reason:   Specialty Services Required     Number of Visits Requested:   1    tretinoin (RETIN-A) 0.1 % topical cream     Sig: APPLY CREAM TO AFFECTED AREA ONCE EVERY NIGHT AT BEDTIME AS TOLERATED    conjugated estrogens (Premarin) 0.625 mg/gram vaginal cream     Sig: Premarin 0.625 mg/gram vaginal cream    melatonin 5 mg tablet     Sig: Take 2.5 mg by mouth nightly. PLAN       TIME 12 (Minutes) SPENT RELATED TO THIS PHONE ENCOUNTER    We discussed the expected course, resolution and complications of the diagnosis(es) in detail. Medication risks, benefits, costs, interactions, and alternatives were discussed as indicated. I advised her to contact the office if her condition worsens, changes or fails to improve as anticipated.  She expressed understanding with the diagnosis(es) and plan

## 2021-12-15 ENCOUNTER — TELEPHONE (OUTPATIENT)
Dept: FAMILY MEDICINE CLINIC | Age: 66
End: 2021-12-15

## 2021-12-15 NOTE — TELEPHONE ENCOUNTER
----- Message from Dionisio Miner sent at 12/15/2021 12:42 PM EST -----  Subject: Refill Request    QUESTIONS  Name of Medication? estradioL (VIVELLE) 0.0375 mg/24 hr  Patient-reported dosage and instructions? 0.0375 mg / 24 hr ; APPLY TWO   TIMES A WEEK  How many days do you have left? 0  Preferred Pharmacy? [facility] phone number (if available)? [phone]  Additional Information for Provider? patient of Agnieszka Cordero NP is   calling requesting to refill the medication. please call and advise once   approved.  ---------------------------------------------------------------------------  --------------  CALL BACK INFO  What is the best way for the office to contact you? OK to leave message on   [employer], OK to respond with electronic message via [employer] portal (only   for patients who have registered [employer] account)  Preferred Call Back Phone Number?  [phone]

## 2021-12-16 RX ORDER — ESTRADIOL 0.04 MG/D
FILM, EXTENDED RELEASE TRANSDERMAL
Qty: 24 PATCH | Refills: 1 | Status: SHIPPED | OUTPATIENT
Start: 2021-12-16 | End: 2021-12-27 | Stop reason: SDUPTHER

## 2021-12-27 ENCOUNTER — TELEPHONE (OUTPATIENT)
Dept: FAMILY MEDICINE CLINIC | Age: 66
End: 2021-12-27

## 2021-12-27 RX ORDER — ESTRADIOL 0.04 MG/D
FILM, EXTENDED RELEASE TRANSDERMAL
Qty: 24 PATCH | Refills: 1 | Status: SHIPPED | OUTPATIENT
Start: 2021-12-27 | End: 2022-09-26 | Stop reason: SDUPTHER

## 2021-12-27 NOTE — TELEPHONE ENCOUNTER
----- Message from Ruthie Bryant sent at 12/27/2021  2:11 PM EST -----  Subject: Message to Provider    QUESTIONS  Information for Provider? Patient Christal Diaz called 12/27/21 @ 2:08   PM to follow up with her medication estradioL (VIVELLE) 0.0375 mg/24 hr   faxed to [facility] instead of sending it to [facility] from now   on. Please call back today 12/27/21 at anytime. Please fax the medication   to [facility] [place]  ---------------------------------------------------------------------------  --------------  [address]  What is the best way for the office to contact you? OK to leave message on   voicemail  Preferred Call Back Phone Number? [phone]  ---------------------------------------------------------------------------  --------------  SCRIPT ANSWERS  Relationship to Patient?  Self

## 2022-01-10 DIAGNOSIS — E03.9 ACQUIRED HYPOTHYROIDISM: Primary | ICD-10-CM

## 2022-01-10 RX ORDER — LEVOTHYROXINE AND LIOTHYRONINE 9.5; 2.25 UG/1; UG/1
TABLET ORAL
Qty: 270 TABLET | Refills: 0 | Status: SHIPPED | OUTPATIENT
Start: 2022-01-10 | End: 2022-01-22

## 2022-01-31 ENCOUNTER — TELEPHONE (OUTPATIENT)
Dept: FAMILY MEDICINE CLINIC | Age: 67
End: 2022-01-31

## 2022-01-31 NOTE — TELEPHONE ENCOUNTER
[facility] called about needing clarification for the Thyroid medication that was sent on 1/22.     Ref# [id number]       Phone number: [phone]

## 2022-02-14 RX ORDER — ATORVASTATIN CALCIUM 40 MG/1
TABLET, FILM COATED ORAL
Qty: 90 TABLET | Refills: 2 | Status: SHIPPED | OUTPATIENT
Start: 2022-02-14 | End: 2022-10-31

## 2022-02-16 ENCOUNTER — TELEPHONE (OUTPATIENT)
Dept: FAMILY MEDICINE CLINIC | Age: 67
End: 2022-02-16

## 2022-02-16 DIAGNOSIS — Z00.00 ENCOUNTER FOR ROUTINE ADULT HEALTH EXAMINATION WITHOUT ABNORMAL FINDINGS: ICD-10-CM

## 2022-02-16 DIAGNOSIS — E78.2 MIXED HYPERLIPIDEMIA: ICD-10-CM

## 2022-02-16 DIAGNOSIS — E55.9 VITAMIN D DEFICIENCY: ICD-10-CM

## 2022-02-16 DIAGNOSIS — E03.9 ACQUIRED HYPOTHYROIDISM: ICD-10-CM

## 2022-02-16 DIAGNOSIS — E53.8 VITAMIN B12 DEFICIENCY: ICD-10-CM

## 2022-02-16 DIAGNOSIS — E03.9 ACQUIRED HYPOTHYROIDISM: Primary | ICD-10-CM

## 2022-02-16 RX ORDER — LEVOTHYROXINE AND LIOTHYRONINE 9.5; 2.25 UG/1; UG/1
TABLET ORAL
Qty: 270 TABLET | Refills: 0 | Status: SHIPPED | OUTPATIENT
Start: 2022-02-16 | End: 2022-02-21 | Stop reason: SDUPTHER

## 2022-02-18 ENCOUNTER — TELEPHONE (OUTPATIENT)
Dept: FAMILY MEDICINE CLINIC | Age: 67
End: 2022-02-18

## 2022-02-18 NOTE — TELEPHONE ENCOUNTER
----- Message from Krista Webb sent at 2/18/2022 12:08 PM EST -----  Subject: Message to Provider    QUESTIONS  Information for Provider? patient has questions about bill she just   received for 2/8/2021. Statement day was 1/19/2022. Please call asap  ---------------------------------------------------------------------------  --------------  CALL BACK INFO  What is the best way for the office to contact you? OK to leave message on   voicemail  Preferred Call Back Phone Number? [phone]  ---------------------------------------------------------------------------  --------------  SCRIPT ANSWERS  Relationship to Patient?  Self

## 2022-02-21 ENCOUNTER — TELEPHONE (OUTPATIENT)
Dept: FAMILY MEDICINE CLINIC | Age: 67
End: 2022-02-21

## 2022-02-21 DIAGNOSIS — E03.9 ACQUIRED HYPOTHYROIDISM: ICD-10-CM

## 2022-02-21 RX ORDER — THYROID, PORCINE 15 MG/1
TABLET ORAL
Qty: 270 TABLET | Refills: 0 | Status: SHIPPED | OUTPATIENT
Start: 2022-02-21 | End: 2022-05-02

## 2022-02-21 NOTE — TELEPHONE ENCOUNTER
----- Message from Andrea Granville sent at 2/21/2022  8:53 AM EST -----  Subject: Medication Problem    QUESTIONS  Name of Medication? thyroid, [place], ([place] Thyroid) 15 mg tablet  Patient-reported dosage and instructions? N/A  What question or problem do you have with the medication? ANUPAM needed on RX   or providr alternate because the doesn't cover generic. Phone Number   [phone] Option 2 Reference #- [id number] Conchita Britt Mahogany Alex )  Preferred Pharmacy? [address] phone number (if available)? [phone]  Additional Information for Provider?   ---------------------------------------------------------------------------  --------------  CALL BACK INFO  What is the best way for the office to contact you? OK to leave message on   voicemail  Preferred Call Back Phone Number? [phone]  ---------------------------------------------------------------------------  --------------  SCRIPT ANSWERS  Relationship to Patient?  Self

## 2022-02-22 ENCOUNTER — TELEPHONE (OUTPATIENT)
Dept: FAMILY MEDICINE CLINIC | Age: 67
End: 2022-02-22

## 2022-02-22 NOTE — TELEPHONE ENCOUNTER
[facility] called about the pt's [place] Thyroid medication. They were saying something about needing a different medication or for the may substitute section put No so that they can dispense the [place] Thyroid.     Their phone number is [phone]  Ref number [id number]

## 2022-04-12 ENCOUNTER — OFFICE VISIT (OUTPATIENT)
Dept: FAMILY MEDICINE CLINIC | Age: 67
End: 2022-04-12
Payer: COMMERCIAL

## 2022-04-12 VITALS
RESPIRATION RATE: 18 BRPM | HEART RATE: 76 BPM | OXYGEN SATURATION: 98 % | WEIGHT: 114.4 LBS | DIASTOLIC BLOOD PRESSURE: 74 MMHG | BODY MASS INDEX: 21.6 KG/M2 | SYSTOLIC BLOOD PRESSURE: 106 MMHG | TEMPERATURE: 97.1 F | HEIGHT: 61 IN

## 2022-04-12 DIAGNOSIS — Z79.890 HORMONE REPLACEMENT THERAPY (HRT): ICD-10-CM

## 2022-04-12 DIAGNOSIS — M79.676 PAIN OF GREAT TOE, UNSPECIFIED LATERALITY: ICD-10-CM

## 2022-04-12 DIAGNOSIS — E78.2 MIXED HYPERLIPIDEMIA: ICD-10-CM

## 2022-04-12 DIAGNOSIS — E53.8 VITAMIN B12 DEFICIENCY: ICD-10-CM

## 2022-04-12 DIAGNOSIS — E55.9 VITAMIN D DEFICIENCY: ICD-10-CM

## 2022-04-12 DIAGNOSIS — E03.9 ACQUIRED HYPOTHYROIDISM: Primary | ICD-10-CM

## 2022-04-12 DIAGNOSIS — F41.9 ANXIETY: ICD-10-CM

## 2022-04-12 PROCEDURE — 99214 OFFICE O/P EST MOD 30 MIN: CPT | Performed by: NURSE PRACTITIONER

## 2022-04-12 RX ORDER — BISMUTH SUBSALICYLATE 262 MG
1 TABLET,CHEWABLE ORAL DAILY
COMMUNITY

## 2022-04-12 NOTE — PROGRESS NOTES
Emelina Scott is a 78 y. o.female presents with   Chief Complaint   Patient presents with    Physical       68-year-old female presents today in office for routine follow-up. She has history significant for anxiety relates that she did not start the Pristiq as she was concerned about some of her symptoms. She states she is managing well the anxiety and does not wish to start any medication for the anxiety. She has hypothyroidism on [place] Thyroid. She relates she had all lab work conducted this morning. She goes on to complain of bilateral great toe pain states it is worsened over the past several weeks. Subjective:           Past Medical History:   Diagnosis Date    Thyroid disease      Past Surgical History:   Procedure Laterality Date    HX TOTAL COLECTOMY       Social History     Socioeconomic History    Marital status:    Tobacco Use    Smoking status: Never Smoker    Smokeless tobacco: Never Used   Vaping Use    Vaping Use: Never used   Substance and Sexual Activity    Alcohol use: Yes     Alcohol/week: 0.0 - 14.0 standard drinks    Drug use: Never     Current Outpatient Medications   Medication Sig Dispense Refill    multivitamin (ONE A DAY) tablet Take 1 Tablet by mouth daily.  [place] Thyroid 15 mg tablet TAKE 3 TABLETS DAILY 270 Tablet 0    atorvastatin (LIPITOR) 40 mg tablet TAKE 1 TABLET DAILY 90 Tablet 2    estradioL (VIVELLE) 0.0375 mg/24 hr APPLY TWO TIMES A WEEK 24 Patch 1    tretinoin (RETIN-A) 0.1 % topical cream APPLY CREAM TO AFFECTED AREA ONCE EVERY NIGHT AT BEDTIME AS TOLERATED      conjugated estrogens (Premarin) 0.625 mg/gram vaginal cream Premarin 0.625 mg/gram vaginal cream      DOXYLAMINE SUCCINATE PO Take 1 Tab by mouth nightly.  cholecalciferol, vitamin d3, (Vitamin D3) 10 mcg (400 unit) cap Vitamin D3   5,000 IU daily      betamethasone dipropionate (DIPROSONE) 0.05 % topical cream Apply  to affected area two (2) times a day.  90 g 2    fluticasone propionate (FLONASE) 50 mcg/actuation nasal spray 2 sprays each nostril everyday (Patient not taking: Reported on 4/12/2022) 1 Bottle 5    cetirizine (ZYRTEC) 10 mg tablet Take 1 Tab by mouth nightly. (Patient not taking: Reported on 4/12/2022) 30 Tab 0     Allergies   Allergen Reactions    Effexor [Venlafaxine] Other (comments)     Severe headaches     Metronidazole Diarrhea, Nausea and Vomiting and Nausea Only     Pt states she gets N/V and diarrhea with IV administration       The patient has a family history of    REVIEW OF SYSTEMS  Review of Systems   Constitutional: Negative for chills and fever. HENT: Negative for ear discharge, ear pain, hearing loss, sinus pain and sore throat. Eyes: Negative for pain. Respiratory: Negative for cough and shortness of breath. Cardiovascular: Negative for chest pain, palpitations and leg swelling. Gastrointestinal: Negative for abdominal pain, nausea and vomiting. Genitourinary: Negative for dysuria, frequency and urgency. Musculoskeletal: Negative for falls, myalgias and neck pain. Skin: Negative for rash. Neurological: Negative for dizziness, tingling, tremors and headaches. Psychiatric/Behavioral: Negative for depression, substance abuse and suicidal ideas. The patient is not nervous/anxious and does not have insomnia.         Objective:     Visit Vitals  /74 (BP 1 Location: Left upper arm, BP Patient Position: Sitting, BP Cuff Size: Large adult)   Pulse 76   Temp 97.1 °F (36.2 °C) (Temporal)   Resp 18   Ht 5' 1\" (1.549 m)   Wt 114 lb 6.4 oz (51.9 kg)   SpO2 98%   BMI 21.62 kg/m²       Current Outpatient Medications   Medication Instructions    [place] Thyroid 15 mg tablet TAKE 3 TABLETS DAILY    atorvastatin (LIPITOR) 40 mg tablet TAKE 1 TABLET DAILY    betamethasone dipropionate (DIPROSONE) 0.05 % topical cream Topical, 2 TIMES DAILY    cetirizine (ZYRTEC) 10 mg, Oral, EVERY BEDTIME    cholecalciferol, vitamin d3, (Vitamin D3) 10 mcg (400 unit) cap Vitamin D3   5,000 IU daily    conjugated estrogens (Premarin) 0.625 mg/gram vaginal cream Premarin 0.625 mg/gram vaginal cream    DOXYLAMINE SUCCINATE PO 1 Tablet, Oral, EVERY BEDTIME    estradioL (VIVELLE) 0.0375 mg/24 hr APPLY TWO TIMES A WEEK    fluticasone propionate (FLONASE) 50 mcg/actuation nasal spray 2 sprays each nostril everyday    multivitamin (ONE A DAY) tablet 1 Tablet, Oral, DAILY    tretinoin (RETIN-A) 0.1 % topical cream APPLY CREAM TO AFFECTED AREA ONCE EVERY NIGHT AT BEDTIME AS TOLERATED        PHYSICAL EXAM  Physical Exam  Constitutional:       Appearance: Normal appearance. Cardiovascular:      Heart sounds: Normal heart sounds. Pulmonary:      Breath sounds: Normal breath sounds. Musculoskeletal:         General: Tenderness present. Right lower leg: No edema. Left lower leg: No edema. Skin:     General: Skin is warm and dry. Neurological:      Mental Status: She is alert and oriented to person, place, and time. Psychiatric:         Mood and Affect: Mood normal.         Behavior: Behavior normal.         Thought Content: Thought content normal.         Judgment: Judgment normal.         Assessment/Plan:     Diagnoses and all orders for this visit:    1. Acquired hypothyroidism    2. Vitamin B12 deficiency    3. Mixed hyperlipidemia    4. Vitamin D deficiency    5. Anxiety    6. Hormone replacement therapy (HRT)    7. Pain of great toe, unspecified laterality  -     REFERRAL TO PODIATRY    I am awaiting lab results any treatment changes will be contingent upon those findings    Follow-up and Dispositions    · Return in about 1 year (around 4/12/2023). Disclaimer:    I have discussed the diagnosis with the patient and the intended plan as seen above. The patient understands our medical plan. The risks, benefits and significant side effects of all medications have been reviewed.  Anticipated time course and progression of condition reviewed. All questions have been addressed. She received an after visit summary, with information reviewed, and questions answered. Where appropriate, she is instructed to call the clinic if she has not been notified either by phone or through [address] with the results of her tests or with an appointment plan for any referrals within 1 week(s). The patient  is to call if her condition worsens or fails to improve or if significant side effects are experienced.        Charity Dunlap, IDANIA

## 2022-04-12 NOTE — PROGRESS NOTES
Sonja Hyatt presents today for   Chief Complaint   Patient presents with    Physical       Is someone accompanying this pt? No    Is the patient using any DME equipment during OV? No    Depression Screening:  3 most recent PHQ Screens 4/12/2022   Little interest or pleasure in doing things Not at all   Feeling down, depressed, irritable, or hopeless Not at all   Total Score PHQ 2 0       Learning Assessment:  Learning Assessment 11/6/2020   PRIMARY LEARNER Patient   HIGHEST LEVEL OF EDUCATION - PRIMARY LEARNER  2 YEARS OF COLLEGE   BARRIERS PRIMARY LEARNER NONE   CO-LEARNER CAREGIVER No   PRIMARY LANGUAGE ENGLISH   LEARNER PREFERENCE PRIMARY VIDEOS     READING   ANSWERED BY patient   RELATIONSHIP SELF       Fall Risk  Fall Risk Assessment, last 12 mths 4/12/2022   Able to walk? Yes   Fall in past 12 months? 0   Do you feel unsteady? 0   Are you worried about falling 0       Health Maintenance reviewed and discussed and ordered per Provider. Health Maintenance Due   Topic Date Due    Hepatitis C Screening  Never done    DTaP/Tdap/Td series (1 - Tdap) Never done    Shingrix Vaccine Age 50> (1 of 2) Never done    Bone Densitometry (Dexa) Screening  Never done    Pneumococcal 65+ years (2 of 2 - PPSV23) 11/08/2020    COVID-19 Vaccine (3 - Booster for Moderna series) 07/18/2021    Lipid Screen  02/10/2022   . Coordination of Care:    1. Have you been to the ER, urgent care clinic since your last visit? Hospitalized since your last visit? No    2. Have you seen or consulted any other health care providers outside of the [address] [place] [address] since your last visit? Include any pap smears or colon screening. No    3. For patients aged 39-70: Has the patient had a colonoscopy / FIT/ Cologuard? Yes - no Care Gap present      If the patient is female:    4. For patients aged 41-77: Has the patient had a mammogram within the past 2 years? No - scheduled for 4/18/22      5.  For patients aged 21-65: Has the patient had a pap smear?  No

## 2022-05-02 DIAGNOSIS — E03.9 ACQUIRED HYPOTHYROIDISM: ICD-10-CM

## 2022-05-02 RX ORDER — THYROID, PORCINE 15 MG/1
TABLET ORAL
Qty: 270 TABLET | Refills: 0 | Status: SHIPPED | OUTPATIENT
Start: 2022-05-02 | End: 2022-07-25

## 2022-05-09 ENCOUNTER — TELEPHONE (OUTPATIENT)
Dept: FAMILY MEDICINE CLINIC | Age: 67
End: 2022-05-09

## 2022-05-09 NOTE — TELEPHONE ENCOUNTER
Pt was calling in to remind you about her blood work? She said she got it done at [facility].

## 2022-08-03 ENCOUNTER — VIRTUAL VISIT (OUTPATIENT)
Dept: FAMILY MEDICINE CLINIC | Age: 67
End: 2022-08-03
Payer: COMMERCIAL

## 2022-08-03 DIAGNOSIS — J06.9 ACUTE URI: ICD-10-CM

## 2022-08-03 DIAGNOSIS — R06.02 SOB (SHORTNESS OF BREATH): Primary | ICD-10-CM

## 2022-08-03 PROCEDURE — 1123F ACP DISCUSS/DSCN MKR DOCD: CPT | Performed by: FAMILY MEDICINE

## 2022-08-03 PROCEDURE — 99213 OFFICE O/P EST LOW 20 MIN: CPT | Performed by: FAMILY MEDICINE

## 2022-08-03 RX ORDER — BENZONATATE 200 MG/1
200 CAPSULE ORAL
Qty: 21 CAPSULE | Refills: 0 | Status: SHIPPED | OUTPATIENT
Start: 2022-08-03 | End: 2022-08-10

## 2022-08-03 RX ORDER — ALBUTEROL SULFATE 90 UG/1
2 AEROSOL, METERED RESPIRATORY (INHALATION)
Qty: 18 G | Refills: 0 | Status: SHIPPED | OUTPATIENT
Start: 2022-08-03

## 2022-08-03 NOTE — PROGRESS NOTES
Trent Perez (: 1955) is a 75 y.o. female, established patient, here for evaluation of the following chief complaint(s):   Cold Symptoms (Has some cough congestion low grade fever and shortness of breath more so at night )       ASSESSMENT/PLAN:  Below is the assessment and plan developed based on review of pertinent history, labs, studies, and medications. 1. SOB (shortness of breath)  -     albuterol (PROVENTIL HFA, VENTOLIN HFA, PROAIR HFA) 90 mcg/actuation inhaler; Take 2 Puffs by inhalation every four (4) hours as needed for Wheezing. Indications: bronchospasm prevention, Normal, Disp-18 g, R-0  2. Acute URI  -     benzonatate (TESSALON) 200 mg capsule; Take 1 Capsule by mouth three (3) times daily as needed for Cough for up to 7 days. , Normal, Disp-21 Capsule, R-0  Patient likely has upper respiratory infection. .  Recommend albuterol as needed for possible bronchospasms. Also given Tessalon for cough. Course increase rest and hydration, proper nutrition. And follow-up on Friday to see how she is feeling may consider evaluation in office evaluate lungs and consider potentially chest x-ray. SUBJECTIVE/OBJECTIVE:  HPI patient notes that her grandchild was over the last week. Grandchild had an ear infection. And she noticed that she also developed sore throat a week after. She also noticed that she had a little difficulty breathing and feels like there is a tightness in her chest.  She has been coughing. And having trouble sleeping because of the cough. Tested negative for COVID but concerns because of chills. Used an old albuterol inhaler with minimal effect. Denies any other symptoms of diarrhea or myalgias.     Review of Systems cough and shortness of breath    No data recorded     Physical Exam    virtual    On this date 2022 I have spent 10 minutes reviewing previous notes, test results and face to face (virtual) with the patient discussing the diagnosis and importance of compliance with the treatment plan as well as documenting on the day of the visit. Jose Becker, was evaluated through a synchronous (real-time) audio-video encounter. The patient (or guardian if applicable) is aware that this is a billable service, which includes applicable co-pays. This Virtual Visit was conducted with patient's (and/or legal guardian's) consent. The visit was conducted pursuant to the emergency declaration under the [address] [place] [address] authority and the [employer] General Act. Patient identification was verified, and a caregiver was present when appropriate. The patient was located at: Home: [address] [place] [phone]  The provider was located at: Facility ([facility]): [facility]  [phone]       An electronic signature was used to authenticate this note.   -- Tho Nagel MD

## 2022-08-03 NOTE — PROGRESS NOTES
Shi Fung presents today for   Chief Complaint   Patient presents with    Cold Symptoms     Has some cough congestion low grade fever and shortness of breath more so at night        Virtual/telephone visit    Depression Screening:  3 most recent PHQ Screens 8/3/2022   Little interest or pleasure in doing things Not at all   Feeling down, depressed, irritable, or hopeless Not at all   Total Score PHQ 2 0       Learning Assessment:  Learning Assessment 11/6/2020   PRIMARY LEARNER Patient   HIGHEST LEVEL OF EDUCATION - PRIMARY LEARNER  2 YEARS OF COLLEGE   BARRIERS PRIMARY LEARNER NONE   CO-LEARNER CAREGIVER No   PRIMARY LANGUAGE ENGLISH   LEARNER PREFERENCE PRIMARY VIDEOS     READING   ANSWERED BY patient   RELATIONSHIP SELF       Fall Risk  Fall Risk Assessment, last 12 mths 8/3/2022   Able to walk? Yes   Fall in past 12 months? 0   Do you feel unsteady? 0   Are you worried about falling 0       ADL  ADL Assessment 11/6/2020   Feeding yourself No Help Needed   Getting from bed to chair No Help Needed   Getting dressed No Help Needed   Bathing or showering No Help Needed   Walk across the room (includes cane/walker) No Help Needed   Using the telphone No Help Needed   Taking your medications No Help Needed   Preparing meals No Help Needed   Managing money (expenses/bills) No Help Needed   Moderately strenuous housework (laundry) No Help Needed   Shopping for personal items (toiletries/medicines) No Help Needed   Shopping for groceries No Help Needed   Driving No Help Needed   Climbing a flight of stairs No Help Needed   Getting to places beyond walking distances No Help Needed       Travel Screening:    Travel Screening       Question Response    In the last 10 days, have you been in contact with someone who was confirmed or suspected to have Coronavirus/COVID-19? No / Unsure    Have you had a COVID-19 viral test in the last 10 days?  Yes - Negative result    Do you have any of the following new or worsening symptoms? None of these    Have you traveled internationally or domestically in the last month? No          Travel History   Travel since 07/03/22    No documented travel since 07/03/22         Health Maintenance reviewed and discussed and ordered per Provider. Health Maintenance Due   Topic Date Due    Hepatitis C Screening  Never done    DTaP/Tdap/Td series (1 - Tdap) Never done    Shingrix Vaccine Age 50> (1 of 2) Never done    Bone Densitometry (Dexa) Screening  Never done    Pneumococcal 65+ years (2 - PPSV23 or PCV20) 11/08/2020    COVID-19 Vaccine (3 - Booster for Hayes Chris series) 07/18/2021   . Coordination of Care:  1. \"Have you been to the ER, urgent care clinic since your last visit? Hospitalized since your last visit? \" No    2. \"Have you seen or consulted any other health care providers outside of the [address] [place] [address] since your last visit? \" No     3. For patients aged 39-70: Has the patient had a colonoscopy? Yes - no Care Gap present     If the patient is female:    4. For patients aged 41-77: Has the patient had a mammogram within the past 2 years? Yes - no Care Gap present    5. For patients aged 21-65: Has the patient had a pap smear?  NA - based on age

## 2022-08-05 ENCOUNTER — OFFICE VISIT (OUTPATIENT)
Dept: FAMILY MEDICINE CLINIC | Age: 67
End: 2022-08-05
Payer: COMMERCIAL

## 2022-08-05 ENCOUNTER — HOSPITAL ENCOUNTER (OUTPATIENT)
Dept: GENERAL RADIOLOGY | Age: 67
Discharge: HOME | End: 2022-08-05
Attending: FAMILY MEDICINE
Payer: COMMERCIAL

## 2022-08-05 VITALS
HEIGHT: 61 IN | SYSTOLIC BLOOD PRESSURE: 121 MMHG | TEMPERATURE: 97.3 F | HEART RATE: 81 BPM | OXYGEN SATURATION: 98 % | RESPIRATION RATE: 16 BRPM | WEIGHT: 113.6 LBS | DIASTOLIC BLOOD PRESSURE: 76 MMHG | BODY MASS INDEX: 21.45 KG/M2

## 2022-08-05 DIAGNOSIS — R05.9 COUGH: Primary | ICD-10-CM

## 2022-08-05 DIAGNOSIS — R05.9 COUGH: ICD-10-CM

## 2022-08-05 PROCEDURE — 71046 X-RAY EXAM CHEST 2 VIEWS: CPT

## 2022-08-05 PROCEDURE — 1123F ACP DISCUSS/DSCN MKR DOCD: CPT | Performed by: FAMILY MEDICINE

## 2022-08-05 PROCEDURE — 99214 OFFICE O/P EST MOD 30 MIN: CPT | Performed by: FAMILY MEDICINE

## 2022-08-05 NOTE — PROGRESS NOTES
Trav Edwards is a 75 y.o. female who presents to the office today for the following:  Chief Complaint   Patient presents with    Follow-up     Follow upon breathing        Allergies   Allergen Reactions    Effexor [Venlafaxine] Other (comments)     Severe headaches     Metronidazole Diarrhea, Nausea and Vomiting and Nausea Only     Pt states she gets N/V and diarrhea with IV administration         Current Outpatient Medications   Medication Sig    albuterol (PROVENTIL HFA, VENTOLIN HFA, PROAIR HFA) 90 mcg/actuation inhaler Take 2 Puffs by inhalation every four (4) hours as needed for Wheezing. Indications: bronchospasm prevention    benzonatate (TESSALON) 200 mg capsule Take 1 Capsule by mouth three (3) times daily as needed for Cough for up to 7 days. [place] Thyroid 15 mg tablet TAKE 3 TABLETS DAILY    multivitamin (ONE A DAY) tablet Take 1 Tablet by mouth daily. atorvastatin (LIPITOR) 40 mg tablet TAKE 1 TABLET DAILY    estradioL (VIVELLE) 0.0375 mg/24 hr APPLY TWO TIMES A WEEK    tretinoin (RETIN-A) 0.1 % topical cream APPLY CREAM TO AFFECTED AREA ONCE EVERY NIGHT AT BEDTIME AS TOLERATED    conjugated estrogens (Premarin) 0.625 mg/gram vaginal cream Premarin 0.625 mg/gram vaginal cream    DOXYLAMINE SUCCINATE PO Take 1 Tab by mouth nightly. betamethasone dipropionate (DIPROSONE) 0.05 % topical cream Apply  to affected area two (2) times a day. cholecalciferol, vitamin d3, 10 mcg (400 unit) cap Vitamin D3   5,000 IU daily (Patient not taking: No sig reported)    fluticasone propionate (FLONASE) 50 mcg/actuation nasal spray 2 sprays each nostril everyday (Patient not taking: No sig reported)    cetirizine (ZYRTEC) 10 mg tablet Take 1 Tab by mouth nightly. (Patient not taking: No sig reported)     No current facility-administered medications for this visit.        Past Medical History:   Diagnosis Date    Thyroid disease        Past Surgical History:   Procedure Laterality Date    HX TOTAL COLECTOMY         Social History     Socioeconomic History    Marital status:      Spouse name: Not on file    Number of children: Not on file    Years of education: Not on file    Highest education level: Not on file   Occupational History    Not on file   Tobacco Use    Smoking status: Never    Smokeless tobacco: Never   Vaping Use    Vaping Use: Never used   Substance and Sexual Activity    Alcohol use: Yes     Alcohol/week: 0.0 - 14.0 standard drinks    Drug use: Never    Sexual activity: Not on file   Other Topics Concern    Not on file   Social History Narrative    Not on file     Social Determinants of Health     Financial Resource Strain: Not on file   Food Insecurity: Not on file   Transportation Needs: Not on file   Physical Activity: Not on file   Stress: Not on file   Social Connections: Not on file   Intimate Partner Violence: Not on file   Housing Stability: Not on file     or  Social History     Tobacco Use   Smoking Status Never   Smokeless Tobacco Never       Family History   Problem Relation Age of Onset    Lung Disease Mother     Heart Attack Father     Heart Attack Brother          HPI:  Patient has an upper respiratory infection and has been feeling very short of breath and having a dry cough. To be using the Tessalon which helps with the cough somewhat and has been using albuterol inhaler which she uses 4 times a day which has been helping somewhat. Does note that she has a distant history of smoking and was smoking for about 20 years but less than half a pack a day. ROS:  Review of Systems   Constitutional: Negative. Respiratory:  Positive for cough and shortness of breath. Negative for wheezing. Cardiovascular: Negative. All other systems reviewed and are negative. Physical Exam:  Visit Vitals  /76   Pulse 81   Temp 97.3 °F (36.3 °C)   Resp 16   Ht 5' 1\" (1.549 m)   Wt 113 lb 9.6 oz (51.5 kg)   SpO2 98%   BMI 21.46 kg/m²     Physical Exam  Vitals reviewed. Cardiovascular:      Rate and Rhythm: Normal rate and regular rhythm. Pulmonary:      Effort: Pulmonary effort is normal. No respiratory distress. Breath sounds: No stridor. No wheezing, rhonchi or rales. Chest:      Chest wall: Tenderness present. Neurological:      Mental Status: She is alert. Assessment/Plan:    Orders Placed This Encounter    XR CHEST PA LAT     Standing Status:   Future     Number of Occurrences:   1     Standing Expiration Date:   9/5/2023    PULMONARY FUNCTION TEST     Cough sob     Standing Status:   Future     Standing Expiration Date:   9/5/2022     or  1. Upper respiratory infection, viral  Will order chest x-ray and pulmonary function test to evaluate shortness of breath for now continue albuterol as needed for cough and bronchospasm. .  Likely upper respiratory viral in nature. And should be self-limiting.  - XR CHEST PA LAT;  Future  - PULMONARY FUNCTION TEST; Future              Matteo Kellogg MD

## 2022-08-05 NOTE — PROGRESS NOTES
Cameron Mead presents today for   Chief Complaint   Patient presents with    Follow-up     Follow upon breathing        Is someone accompanying this pt? No     Is the patient using any DME equipment during OV? No     Depression Screening:  3 most recent PHQ Screens 8/5/2022   Little interest or pleasure in doing things Not at all   Feeling down, depressed, irritable, or hopeless Not at all   Total Score PHQ 2 0       Learning Assessment:  Learning Assessment 11/6/2020   PRIMARY LEARNER Patient   HIGHEST LEVEL OF EDUCATION - PRIMARY LEARNER  2 YEARS OF COLLEGE   BARRIERS PRIMARY LEARNER NONE   CO-LEARNER CAREGIVER No   PRIMARY LANGUAGE ENGLISH   LEARNER PREFERENCE PRIMARY VIDEOS     READING   ANSWERED BY patient   RELATIONSHIP SELF       Fall Risk  Fall Risk Assessment, last 12 mths 8/5/2022   Able to walk? Yes   Fall in past 12 months? 0   Do you feel unsteady? 0   Are you worried about falling 0       ADL  ADL Assessment 11/6/2020   Feeding yourself No Help Needed   Getting from bed to chair No Help Needed   Getting dressed No Help Needed   Bathing or showering No Help Needed   Walk across the room (includes cane/walker) No Help Needed   Using the telphone No Help Needed   Taking your medications No Help Needed   Preparing meals No Help Needed   Managing money (expenses/bills) No Help Needed   Moderately strenuous housework (laundry) No Help Needed   Shopping for personal items (toiletries/medicines) No Help Needed   Shopping for groceries No Help Needed   Driving No Help Needed   Climbing a flight of stairs No Help Needed   Getting to places beyond walking distances No Help Needed       Travel Screening:    Travel Screening       Question Response    In the last 10 days, have you been in contact with someone who was confirmed or suspected to have Coronavirus/COVID-19? No / Unsure    Have you had a COVID-19 viral test in the last 10 days?  Yes - Negative result    Do you have any of the following new or worsening symptoms? None of these    Have you traveled internationally or domestically in the last month? No          Travel History   Travel since 07/05/22    No documented travel since 07/05/22         Health Maintenance reviewed and discussed and ordered per Provider. Health Maintenance Due   Topic Date Due    Hepatitis C Screening  Never done    DTaP/Tdap/Td series (1 - Tdap) Never done    Shingrix Vaccine Age 50> (1 of 2) Never done    Bone Densitometry (Dexa) Screening  Never done    Pneumococcal 65+ years (2 - PPSV23 or PCV20) 11/08/2020    COVID-19 Vaccine (3 - Booster for Yvette Samantha series) 07/18/2021   . Coordination of Care:  1. \"Have you been to the ER, urgent care clinic since your last visit? Hospitalized since your last visit? \" No    2. \"Have you seen or consulted any other health care providers outside of the [address] [place] [address] since your last visit? \" No     3. For patients aged 39-70: Has the patient had a colonoscopy? Yes - no Care Gap present     If the patient is female:    4. For patients aged 41-77: Has the patient had a mammogram within the past 2 years? Yes - no Care Gap present    5. For patients aged 21-65: Has the patient had a pap smear?  NA - based on age

## 2022-08-24 ENCOUNTER — HOSPITAL ENCOUNTER (OUTPATIENT)
Dept: PULMONOLOGY | Age: 67
Discharge: HOME | End: 2022-08-24
Attending: FAMILY MEDICINE
Payer: COMMERCIAL

## 2022-08-24 VITALS — OXYGEN SATURATION: 98 %

## 2022-08-24 DIAGNOSIS — R05.9 COUGH: ICD-10-CM

## 2022-08-24 PROCEDURE — 94060 EVALUATION OF WHEEZING: CPT

## 2022-08-24 PROCEDURE — 94729 DIFFUSING CAPACITY: CPT

## 2022-08-24 PROCEDURE — 94726 PLETHYSMOGRAPHY LUNG VOLUMES: CPT

## 2022-08-24 RX ORDER — ALBUTEROL SULFATE 0.83 MG/ML
2.5 SOLUTION RESPIRATORY (INHALATION)
Status: COMPLETED | OUTPATIENT
Start: 2022-08-24 | End: 2022-08-24

## 2022-08-24 RX ADMIN — ALBUTEROL SULFATE 2.5 MG: 0.83 SOLUTION RESPIRATORY (INHALATION) at 10:24

## 2022-08-24 NOTE — PROGRESS NOTES
Patient was able to tolerate neb and pft well. She was given unit dose albuterol with just a little gittery feeling from the albuterol.

## 2022-09-26 ENCOUNTER — TELEPHONE (OUTPATIENT)
Dept: FAMILY MEDICINE CLINIC | Age: 67
End: 2022-09-26

## 2022-09-26 DIAGNOSIS — E03.9 ACQUIRED HYPOTHYROIDISM: ICD-10-CM

## 2022-09-26 DIAGNOSIS — Z79.890 HORMONE REPLACEMENT THERAPY (HRT): Primary | ICD-10-CM

## 2022-09-26 RX ORDER — THYROID, PORCINE 15 MG/1
TABLET ORAL
Qty: 270 TABLET | Refills: 2 | Status: CANCELLED | OUTPATIENT
Start: 2022-09-26

## 2022-09-26 RX ORDER — ESTRADIOL 0.04 MG/D
FILM, EXTENDED RELEASE TRANSDERMAL
Qty: 24 PATCH | Refills: 1 | Status: CANCELLED | OUTPATIENT
Start: 2022-09-26

## 2022-09-26 RX ORDER — THYROID, PORCINE 15 MG/1
TABLET ORAL
Qty: 15 TABLET | Refills: 0 | Status: SHIPPED | OUTPATIENT
Start: 2022-09-26

## 2022-09-26 RX ORDER — ESTRADIOL 0.04 MG/D
FILM, EXTENDED RELEASE TRANSDERMAL
Qty: 2 PATCH | Refills: 0 | Status: SHIPPED | OUTPATIENT
Start: 2022-09-26

## 2022-09-26 NOTE — TELEPHONE ENCOUNTER
Pt needs some of her meds sent in enough for 3-4 days. [place] Thyroid and Estradiol. [place]. Thank you.

## 2022-10-31 RX ORDER — ATORVASTATIN CALCIUM 40 MG/1
TABLET, FILM COATED ORAL
Qty: 90 TABLET | Refills: 2 | Status: SHIPPED | OUTPATIENT
Start: 2022-10-31

## 2023-01-17 RX ORDER — BETAMETHASONE DIPROPIONATE 0.5 MG/G
CREAM TOPICAL 2 TIMES DAILY
Qty: 90 G | Refills: 2 | Status: SHIPPED | OUTPATIENT
Start: 2023-01-17

## 2023-03-10 ENCOUNTER — OFFICE VISIT (OUTPATIENT)
Dept: FAMILY MEDICINE CLINIC | Facility: CLINIC | Age: 68
End: 2023-03-10
Payer: COMMERCIAL

## 2023-03-10 VITALS
HEART RATE: 77 BPM | TEMPERATURE: 98.4 F | BODY MASS INDEX: 21.83 KG/M2 | WEIGHT: 115.6 LBS | RESPIRATION RATE: 19 BRPM | DIASTOLIC BLOOD PRESSURE: 72 MMHG | OXYGEN SATURATION: 98 % | HEIGHT: 61 IN | SYSTOLIC BLOOD PRESSURE: 115 MMHG

## 2023-03-10 DIAGNOSIS — T14.90XA TRAUMA: Primary | ICD-10-CM

## 2023-03-10 DIAGNOSIS — R07.81 RIB PAIN: ICD-10-CM

## 2023-03-10 PROCEDURE — 99214 OFFICE O/P EST MOD 30 MIN: CPT | Performed by: NURSE PRACTITIONER

## 2023-03-10 PROCEDURE — 1123F ACP DISCUSS/DSCN MKR DOCD: CPT | Performed by: NURSE PRACTITIONER

## 2023-03-10 SDOH — SOCIAL STABILITY: SOCIAL INSECURITY: WITHIN THE LAST YEAR, HAVE YOU BEEN HUMILIATED OR EMOTIONALLY ABUSED IN OTHER WAYS BY YOUR PARTNER OR EX-PARTNER?: YES

## 2023-03-10 SDOH — SOCIAL STABILITY: SOCIAL INSECURITY
WITHIN THE LAST YEAR, HAVE TO BEEN RAPED OR FORCED TO HAVE ANY KIND OF SEXUAL ACTIVITY BY YOUR PARTNER OR EX-PARTNER?: NO

## 2023-03-10 SDOH — SOCIAL STABILITY: SOCIAL INSECURITY
WITHIN THE LAST YEAR, HAVE YOU BEEN KICKED, HIT, SLAPPED, OR OTHERWISE PHYSICALLY HURT BY YOUR PARTNER OR EX-PARTNER?: NO

## 2023-03-10 SDOH — SOCIAL STABILITY: SOCIAL INSECURITY: WITHIN THE LAST YEAR, HAVE YOU BEEN AFRAID OF YOUR PARTNER OR EX-PARTNER?: NO

## 2023-03-10 ASSESSMENT — PATIENT HEALTH QUESTIONNAIRE - PHQ9
SUM OF ALL RESPONSES TO PHQ QUESTIONS 1-9: 1
2. FEELING DOWN, DEPRESSED OR HOPELESS: 1
SUM OF ALL RESPONSES TO PHQ QUESTIONS 1-9: 1
SUM OF ALL RESPONSES TO PHQ9 QUESTIONS 1 & 2: 1
1. LITTLE INTEREST OR PLEASURE IN DOING THINGS: 0

## 2023-03-10 ASSESSMENT — ENCOUNTER SYMPTOMS
CHEST TIGHTNESS: 0
SHORTNESS OF BREATH: 0
COUGH: 0
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
## 2023-03-10 NOTE — PROGRESS NOTES
Rizwana Mendoza presents today for   Chief Complaint   Patient presents with    Chest Injury       Is someone accompanying this pt? no    Is the patient using any DME equipment during OV? no    Depression Screening:  PHQ-9 Questionaire 3/10/2023 8/5/2022 8/3/2022 4/12/2022 8/23/2021 8/23/2021 2/8/2021   Little interest or pleasure in doing things 0 0 0 0 0 0 0   Feeling down, depressed, or hopeless 1 0 0 0 0 0 0   PHQ-9 Total Score 1 0 0 0 0 0 0       Fall Risk  Fall Risk 3/10/2023   2 or more falls in past year? no   Fall with injury in past year? no        Health Maintenance reviewed and discussed and ordered per Provider. Health Maintenance Due   Topic Date Due    Lipids  Never done    Hepatitis C screen  Never done    DTaP/Tdap/Td vaccine (1 - Tdap) Never done    Shingles vaccine (1 of 2) Never done    DEXA (modify frequency per FRAX score)  Never done    Pneumococcal 65+ years Vaccine (2 - PPSV23 if available, else PCV20) 11/08/2020    COVID-19 Vaccine (3 - Booster for Moderna series) 04/15/2021    Flu vaccine (1) 08/01/2022   . Coordination of Care:    1. \"Have you been to the ER, urgent care clinic since your last visit? Hospitalized since your last visit? \" No    2. \"Have you seen or consulted any other health care providers outside of the [address] [place] [address] since your last visit? \" No

## 2023-03-14 ENCOUNTER — TELEPHONE (OUTPATIENT)
Dept: FAMILY MEDICINE CLINIC | Facility: CLINIC | Age: 68
End: 2023-03-14

## 2023-03-14 NOTE — TELEPHONE ENCOUNTER
Pt called about CT of the chest.  She would like it w/o the contrast.  She said she didn't have an allergic reaction, but she didn't feel good while taking it. She said she'll take it with the contrast if she needs to.

## 2023-03-21 SDOH — ECONOMIC STABILITY: HOUSING INSECURITY
IN THE LAST 12 MONTHS, WAS THERE A TIME WHEN YOU DID NOT HAVE A STEADY PLACE TO SLEEP OR SLEPT IN A SHELTER (INCLUDING NOW)?: NO

## 2023-03-21 SDOH — ECONOMIC STABILITY: FOOD INSECURITY: WITHIN THE PAST 12 MONTHS, YOU WORRIED THAT YOUR FOOD WOULD RUN OUT BEFORE YOU GOT MONEY TO BUY MORE.: NEVER TRUE

## 2023-03-21 SDOH — ECONOMIC STABILITY: FOOD INSECURITY: WITHIN THE PAST 12 MONTHS, THE FOOD YOU BOUGHT JUST DIDN'T LAST AND YOU DIDN'T HAVE MONEY TO GET MORE.: NEVER TRUE

## 2023-03-21 SDOH — ECONOMIC STABILITY: INCOME INSECURITY: HOW HARD IS IT FOR YOU TO PAY FOR THE VERY BASICS LIKE FOOD, HOUSING, MEDICAL CARE, AND HEATING?: NOT VERY HARD

## 2023-03-22 ENCOUNTER — TELEPHONE (OUTPATIENT)
Dept: FAMILY MEDICINE CLINIC | Facility: CLINIC | Age: 68
End: 2023-03-22
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
## 2023-03-29 ENCOUNTER — HOSPITAL ENCOUNTER (OUTPATIENT)
Age: 68
Discharge: HOME | End: 2023-03-31
Payer: COMMERCIAL

## 2023-03-29 VITALS
BODY MASS INDEX: 21.71 KG/M2 | WEIGHT: 115 LBS | DIASTOLIC BLOOD PRESSURE: 69 MMHG | HEIGHT: 61 IN | SYSTOLIC BLOOD PRESSURE: 133 MMHG

## 2023-03-29 DIAGNOSIS — I31.39 PERICARDIAL EFFUSION: ICD-10-CM

## 2023-03-29 DIAGNOSIS — I31.39 PERICARDIAL EFFUSION: Primary | ICD-10-CM

## 2023-03-29 LAB
ECHO AO ASC DIAM: 2.5 CM
ECHO AO ASCENDING AORTA INDEX: 1.68 CM/M2
ECHO AO ROOT DIAM: 2.6 CM
ECHO AO ROOT INDEX: 1.74 CM/M2
ECHO AV AREA PEAK VELOCITY: 2.4 CM2
ECHO AV AREA VTI: 2.4 CM2
ECHO AV AREA/BSA PEAK VELOCITY: 1.6 CM2/M2
ECHO AV AREA/BSA VTI: 1.6 CM2/M2
ECHO AV MEAN GRADIENT: 3 MMHG
ECHO AV MEAN VELOCITY: 0.8 M/S
ECHO AV PEAK GRADIENT: 6 MMHG
ECHO AV PEAK VELOCITY: 1.2 M/S
ECHO AV VELOCITY RATIO: 0.75
ECHO AV VTI: 26.3 CM
ECHO BSA: 1.5 M2
ECHO EST RA PRESSURE: 3 MMHG
ECHO LA DIAMETER INDEX: 2.01 CM/M2
ECHO LA DIAMETER: 3 CM
ECHO LA TO AORTIC ROOT RATIO: 1.15
ECHO LA VOL 2C: 33 ML (ref 22–52)
ECHO LA VOL 2C: 36 ML (ref 22–52)
ECHO LA VOL 4C: 34 ML (ref 22–52)
ECHO LA VOL 4C: 37 ML (ref 22–52)
ECHO LA VOL BP: 34 ML (ref 22–52)
ECHO LA VOL/BSA BIPLANE: 23 ML/M2 (ref 16–34)
ECHO LA VOLUME AREA LENGTH: 38 ML
ECHO LA VOLUME INDEX AREA LENGTH: 26 ML/M2 (ref 16–34)
ECHO LV E' LATERAL VELOCITY: 8 CM/S
ECHO LV E' SEPTAL VELOCITY: 10 CM/S
ECHO LV EJECTION FRACTION A2C: 61 %
ECHO LV EJECTION FRACTION A4C: 56 %
ECHO LV EJECTION FRACTION BIPLANE: 61 % (ref 55–100)
ECHO LV FRACTIONAL SHORTENING: 37 % (ref 28–44)
ECHO LV GLOBAL LONGITUDINAL STRAIN (GLS): -17 %
ECHO LV INTERNAL DIMENSION DIASTOLE INDEX: 2.89 CM/M2
ECHO LV INTERNAL DIMENSION DIASTOLIC: 4.3 CM (ref 3.9–5.3)
ECHO LV INTERNAL DIMENSION SYSTOLIC INDEX: 1.81 CM/M2
ECHO LV INTERNAL DIMENSION SYSTOLIC: 2.7 CM
ECHO LV IVSD: 0.7 CM (ref 0.6–0.9)
ECHO LV MASS 2D: 96.8 G (ref 67–162)
ECHO LV MASS INDEX 2D: 65 G/M2 (ref 43–95)
ECHO LV POSTERIOR WALL DIASTOLIC: 0.8 CM (ref 0.6–0.9)
ECHO LV RELATIVE WALL THICKNESS RATIO: 0.37
ECHO LVOT AREA: 3.5 CM2
ECHO LVOT AV VTI INDEX: 0.73
ECHO LVOT DIAM: 2.1 CM
ECHO LVOT MEAN GRADIENT: 2 MMHG
ECHO LVOT PEAK GRADIENT: 3 MMHG
ECHO LVOT PEAK VELOCITY: 0.9 M/S
ECHO LVOT STROKE VOLUME INDEX: 44.8 ML/M2
ECHO LVOT SV: 66.8 ML
ECHO LVOT VTI: 19.3 CM
ECHO MV A VELOCITY: 0.56 M/S
ECHO MV AREA VTI: 4.7 CM2
ECHO MV E DECELERATION TIME (DT): 218.5 MS
ECHO MV E VELOCITY: 0.6 M/S
ECHO MV E/A RATIO: 1.07
ECHO MV E/E' LATERAL: 7.5
ECHO MV E/E' RATIO (AVERAGED): 6.75
ECHO MV E/E' SEPTAL: 6
ECHO MV LVOT VTI INDEX: 0.73
ECHO MV MAX VELOCITY: 0.7 M/S
ECHO MV MEAN GRADIENT: 1 MMHG
ECHO MV MEAN VELOCITY: 0.4 M/S
ECHO MV PEAK GRADIENT: 2 MMHG
ECHO MV VTI: 14.1 CM
ECHO PULMONARY ARTERY END DIASTOLIC PRESSURE: 4 MMHG
ECHO PV MAX VELOCITY: 1 M/S
ECHO PV PEAK GRADIENT: 4 MMHG
ECHO PV REGURGITANT MAX VELOCITY: 1 M/S
ECHO RA VOLUME BIPLANE METHOD OF DISKS: 6 ML
ECHO RA VOLUME INDEX BP: 4 ML/M2
ECHO RA VOLUME: 16 ML
ECHO RA VOLUME: 16 ML
ECHO RIGHT VENTRICULAR SYSTOLIC PRESSURE (RVSP): 14 MMHG
ECHO RV FREE WALL PEAK S': 14 CM/S
ECHO RV TAPSE: 1.9 CM (ref 1.7–?)
ECHO TV REGURGITANT MAX VELOCITY: 1.69 M/S
ECHO TV REGURGITANT PEAK GRADIENT: 11 MMHG

## 2023-03-29 PROCEDURE — 93306 TTE W/DOPPLER COMPLETE: CPT

## 2023-04-05 DIAGNOSIS — R06.02 SHORTNESS OF BREATH: ICD-10-CM

## 2023-04-05 DIAGNOSIS — S29.9XXA CHEST INJURY, INITIAL ENCOUNTER: ICD-10-CM

## 2023-04-05 DIAGNOSIS — T14.90XA TRAUMA: Primary | ICD-10-CM

## 2023-04-05 DIAGNOSIS — R07.81 RIB PAIN: ICD-10-CM

## 2023-04-11 ENCOUNTER — TELEPHONE (OUTPATIENT)
Dept: FAMILY MEDICINE CLINIC | Facility: CLINIC | Age: 68
End: 2023-04-11

## 2023-05-04 RX ORDER — THYROID,PORK 15 MG
TABLET ORAL
Qty: 270 TABLET | Refills: 2 | Status: SHIPPED | OUTPATIENT
Start: 2023-05-04

## 2023-06-02 ENCOUNTER — TELEPHONE (OUTPATIENT)
Dept: FAMILY MEDICINE CLINIC | Facility: CLINIC | Age: 68
End: 2023-06-02

## 2023-06-02 DIAGNOSIS — E03.9 HYPOTHYROIDISM, UNSPECIFIED TYPE: Primary | ICD-10-CM

## 2023-06-02 RX ORDER — THYROID,PORK 15 MG
15 TABLET ORAL DAILY
Qty: 270 TABLET | Refills: 2 | Status: SHIPPED | OUTPATIENT
Start: 2023-06-02 | End: 2023-06-02 | Stop reason: SDUPTHER

## 2023-06-02 RX ORDER — THYROID,PORK 15 MG
15 TABLET ORAL DAILY
Qty: 270 TABLET | Refills: 2 | Status: SHIPPED | OUTPATIENT
Start: 2023-06-02

## 2023-06-02 NOTE — TELEPHONE ENCOUNTER
----- Message from Cecilia Quintero sent at 6/2/2023  9:56 AM EDT -----  Subject: Refill Request    QUESTIONS  Name of Medication? SURJIT THYROID 15 MG tablet  Patient-reported dosage and instructions? 25 mg daily  How many days do you have left? 0  Preferred Pharmacy? [facility] phone number (if available)? [phone]  ---------------------------------------------------------------------------  --------------  Yovany CHOUDHARY  What is the best way for the office to contact you? OK to leave message on   voicemail  Preferred Call Back Phone Number? [phone]  ---------------------------------------------------------------------------  --------------  SCRIPT ANSWERS  Relationship to Patient?  Self

## 2023-06-02 NOTE — TELEPHONE ENCOUNTER
Can you resend the pt's [place] Thyroid to [facility]? She never received it in the mail. I told her we could resend, but she needed to call them as well.

## 2023-06-08 DIAGNOSIS — E03.9 HYPOTHYROIDISM, UNSPECIFIED TYPE: ICD-10-CM

## 2023-06-08 RX ORDER — THYROID,PORK 15 MG
15 TABLET ORAL 3 TIMES DAILY
Qty: 270 TABLET | Refills: 2 | Status: SHIPPED | OUTPATIENT
Start: 2023-06-08

## 2023-06-08 NOTE — TELEPHONE ENCOUNTER
Pt called and said that she takes 45 mg of [place] Thyroid. She takes 3 15 mg tabs in the morning. The Rx we had was 1 tab by mouth daily. I updated it to 3 times day. 90 day supply. Bj. Thank you.

## 2023-07-19 RX ORDER — ATORVASTATIN CALCIUM 40 MG/1
TABLET, FILM COATED ORAL
Qty: 90 TABLET | Refills: 2 | Status: SHIPPED | OUTPATIENT
Start: 2023-07-19

## 2023-08-10 ENCOUNTER — OFFICE VISIT (OUTPATIENT)
Dept: FAMILY MEDICINE CLINIC | Facility: CLINIC | Age: 68
End: 2023-08-10
Payer: COMMERCIAL

## 2023-08-10 VITALS
OXYGEN SATURATION: 98 % | WEIGHT: 114.6 LBS | DIASTOLIC BLOOD PRESSURE: 67 MMHG | SYSTOLIC BLOOD PRESSURE: 109 MMHG | HEART RATE: 85 BPM | BODY MASS INDEX: 21.64 KG/M2 | RESPIRATION RATE: 18 BRPM | TEMPERATURE: 98.3 F | HEIGHT: 61 IN

## 2023-08-10 DIAGNOSIS — Z12.31 SCREENING MAMMOGRAM FOR BREAST CANCER: ICD-10-CM

## 2023-08-10 DIAGNOSIS — E55.9 VITAMIN D DEFICIENCY, UNSPECIFIED: ICD-10-CM

## 2023-08-10 DIAGNOSIS — E78.2 MIXED HYPERLIPIDEMIA: ICD-10-CM

## 2023-08-10 DIAGNOSIS — E53.8 DEFICIENCY OF OTHER SPECIFIED B GROUP VITAMINS: ICD-10-CM

## 2023-08-10 DIAGNOSIS — Z11.59 NEED FOR HEPATITIS C SCREENING TEST: Primary | ICD-10-CM

## 2023-08-10 DIAGNOSIS — Z78.0 POST-MENOPAUSAL: ICD-10-CM

## 2023-08-10 DIAGNOSIS — E03.9 HYPOTHYROIDISM, UNSPECIFIED TYPE: ICD-10-CM

## 2023-08-10 DIAGNOSIS — Z00.00 WELLNESS EXAMINATION: ICD-10-CM

## 2023-08-10 PROCEDURE — 1124F ACP DISCUSS-NO DSCNMKR DOCD: CPT | Performed by: NURSE PRACTITIONER

## 2023-08-10 PROCEDURE — 99214 OFFICE O/P EST MOD 30 MIN: CPT | Performed by: NURSE PRACTITIONER

## 2023-08-10 RX ORDER — PNEUMOCOCCAL VACCINE POLYVALENT 25; 25; 25; 25; 25; 25; 25; 25; 25; 25; 25; 25; 25; 25; 25; 25; 25; 25; 25; 25; 25; 25; 25 UG/.5ML; UG/.5ML; UG/.5ML; UG/.5ML; UG/.5ML; UG/.5ML; UG/.5ML; UG/.5ML; UG/.5ML; UG/.5ML; UG/.5ML; UG/.5ML; UG/.5ML; UG/.5ML; UG/.5ML; UG/.5ML; UG/.5ML; UG/.5ML; UG/.5ML; UG/.5ML; UG/.5ML; UG/.5ML; UG/.5ML
0.5 INJECTION, SOLUTION INTRAMUSCULAR; SUBCUTANEOUS ONCE
Qty: 0.5 ML | Refills: 0 | Status: SHIPPED | OUTPATIENT
Start: 2023-08-10 | End: 2023-08-10

## 2023-08-10 RX ORDER — ESTRADIOL 0.04 MG/D
FILM, EXTENDED RELEASE TRANSDERMAL
COMMUNITY
Start: 2023-05-31

## 2023-08-10 RX ORDER — ZOSTER VACCINE RECOMBINANT, ADJUVANTED 50 MCG/0.5
0.5 KIT INTRAMUSCULAR SEE ADMIN INSTRUCTIONS
Qty: 0.5 ML | Refills: 0 | Status: SHIPPED | OUTPATIENT
Start: 2023-08-10 | End: 2024-02-06

## 2023-08-10 ASSESSMENT — PATIENT HEALTH QUESTIONNAIRE - PHQ9
1. LITTLE INTEREST OR PLEASURE IN DOING THINGS: 0
SUM OF ALL RESPONSES TO PHQ QUESTIONS 1-9: 0
SUM OF ALL RESPONSES TO PHQ9 QUESTIONS 1 & 2: 0
SUM OF ALL RESPONSES TO PHQ QUESTIONS 1-9: 0
2. FEELING DOWN, DEPRESSED OR HOPELESS: 0

## 2023-08-14 ASSESSMENT — ENCOUNTER SYMPTOMS
CHEST TIGHTNESS: 0
SHORTNESS OF BREATH: 0

## 2023-08-14 NOTE — PROGRESS NOTES
Ledon Riedel presents today for No chief complaint on file. Is someone accompanying this pt? no    Is the patient using any DME equipment during OV? no    Depression Screening:  PHQ-9 Questionaire 8/10/2023 3/10/2023 8/5/2022 8/3/2022 4/12/2022 8/23/2021 8/23/2021   Little interest or pleasure in doing things 0 0 0 0 0 0 0   Feeling down, depressed, or hopeless 0 1 0 0 0 0 0   PHQ-9 Total Score 0 1 0 0 0 0 0       Fall Risk  Fall Risk 8/10/2023 3/10/2023   2 or more falls in past year? no no   Fall with injury in past year? no no        Health Maintenance reviewed and discussed and ordered per Provider. Health Maintenance Due   Topic Date Due    Lipids  Never done    Hepatitis C screen  Never done    Shingles vaccine (1 of 2) Never done    DEXA (modify frequency per FRAX score)  Never done    Pneumococcal 65+ years Vaccine (2 - PPSV23 if available, else PCV20) 11/08/2020    COVID-19 Vaccine (4 - Booster for Moderna series) 01/11/2022    DTaP/Tdap/Td vaccine (1 - Tdap) 06/04/2022    Breast cancer screen  04/18/2023   . Coordination of Care:    1. \"Have you been to the ER, urgent care clinic since your last visit? Hospitalized since your last visit? \" No    2. \"Have you seen or consulted any other health care providers outside of the [address] [place] [address] since your last visit? \" No     3. For patients aged 43-73: Has the patient had a colonoscopy / FIT/ Cologuard? Yes - no Care Gap present      If the patient is female:    4. For patients aged 43-66: Has the patient had a mammogram within the past 2 years? No      5. For patients aged 21-65: Has the patient had a pap smear?  Yes - no Care Gap present
the intended plan as seen above. The patient understands our medical plan. The risks, benefits and significant side effects of all medications have been reviewed. Anticipated time course and progression of condition reviewed. All questions have been addressed. She received an after visit summary, with information reviewed, and questions answered. Where appropriate, she is instructed to call the clinic if she has not been notified either by phone or through [address] [place] [address] with the results of her tests or with an appointment plan for any referrals within 1 week(s). The patient  is to call if her condition worsens or fails to improve or if significant side effects are experienced.        Radha Monzon, APRN - CNP

## 2024-02-27 ENCOUNTER — TELEPHONE (OUTPATIENT)
Dept: FAMILY MEDICINE CLINIC | Facility: CLINIC | Age: 69
End: 2024-02-27

## 2024-02-27 ENCOUNTER — TELEMEDICINE (OUTPATIENT)
Dept: FAMILY MEDICINE CLINIC | Facility: CLINIC | Age: 69
End: 2024-02-27
Payer: COMMERCIAL

## 2024-02-27 DIAGNOSIS — K58.2 IRRITABLE BOWEL SYNDROME WITH BOTH CONSTIPATION AND DIARRHEA: Primary | ICD-10-CM

## 2024-02-27 DIAGNOSIS — M62.08 DIASTASIS RECTI: ICD-10-CM

## 2024-02-27 DIAGNOSIS — F41.9 ANXIETY: ICD-10-CM

## 2024-02-27 DIAGNOSIS — K42.9 UMBILICAL HERNIA WITHOUT OBSTRUCTION AND WITHOUT GANGRENE: ICD-10-CM

## 2024-02-27 DIAGNOSIS — E03.9 HYPOTHYROIDISM, UNSPECIFIED TYPE: ICD-10-CM

## 2024-02-27 PROCEDURE — 99443 PR PHYS/QHP TELEPHONE EVALUATION 21-30 MIN: CPT | Performed by: NURSE PRACTITIONER

## 2024-02-27 RX ORDER — CITALOPRAM HYDROBROMIDE 10 MG/1
TABLET ORAL
Qty: 90 TABLET | Refills: 1 | Status: SHIPPED | OUTPATIENT
Start: 2024-02-27

## 2024-02-27 RX ORDER — THYROID,PORK 15 MG
45 TABLET ORAL DAILY
Qty: 270 TABLET | Refills: 3 | Status: SHIPPED | OUTPATIENT
Start: 2024-02-27

## 2024-02-27 RX ORDER — BETAMETHASONE DIPROPIONATE 0.5 MG/G
CREAM TOPICAL 2 TIMES DAILY
Qty: 45 G | Refills: 0 | Status: SHIPPED | OUTPATIENT
Start: 2024-02-27

## 2024-02-27 ASSESSMENT — PATIENT HEALTH QUESTIONNAIRE - PHQ9
SUM OF ALL RESPONSES TO PHQ QUESTIONS 1-9: 0
SUM OF ALL RESPONSES TO PHQ QUESTIONS 1-9: 0
1. LITTLE INTEREST OR PLEASURE IN DOING THINGS: 0
SUM OF ALL RESPONSES TO PHQ QUESTIONS 1-9: 0
SUM OF ALL RESPONSES TO PHQ9 QUESTIONS 1 & 2: 0
2. FEELING DOWN, DEPRESSED OR HOPELESS: 0
SUM OF ALL RESPONSES TO PHQ QUESTIONS 1-9: 0

## 2024-02-27 NOTE — PROGRESS NOTES
Denise Valadez presents today for   Chief Complaint   Patient presents with    Follow-up         Depression Screenin/27/2024     9:39 AM 8/10/2023     1:17 PM 3/10/2023     8:10 AM 2022    11:49 AM 8/3/2022     4:27 PM 2022     3:36 PM 2021    10:54 AM   PHQ-9 Questionaire   Little interest or pleasure in doing things 0 0 0 0 0 0 0   Feeling down, depressed, or hopeless 0 0 1 0 0 0 0   PHQ-9 Total Score 0 0 1 0 0 0 0       Fall Risk      8/10/2023     1:17 PM 3/10/2023     8:10 AM   Fall Risk   2 or more falls in past year? no no   Fall with injury in past year? no no         Health Maintenance reviewed and discussed and ordered per Provider.    Health Maintenance Due   Topic Date Due    Lipids  Never done    Hepatitis C screen  Never done    Shingles vaccine (1 of 2) Never done    Respiratory Syncytial Virus (RSV) Pregnant or age 60 yrs+ (1 - 1-dose 60+ series) Never done    Pneumococcal 65+ years Vaccine (2 - PPSV23 or PCV20) 2020    DTaP/Tdap/Td vaccine (1 - Tdap) 2022    Flu vaccine (1) 2023    COVID-19 Vaccine (4 - - season) 2023   .      \"Have you been to the ER, urgent care clinic since your last visit?  Hospitalized since your last visit?\"    NO    “Have you seen or consulted any other health care providers [facility] of [facility] visit?”    NO

## 2024-04-04 RX ORDER — ATORVASTATIN CALCIUM 40 MG/1
TABLET, FILM COATED ORAL
Qty: 90 TABLET | Refills: 0 | Status: SHIPPED | OUTPATIENT
Start: 2024-04-04

## 2024-05-15 SDOH — ECONOMIC STABILITY: TRANSPORTATION INSECURITY
IN THE PAST 12 MONTHS, HAS LACK OF TRANSPORTATION KEPT YOU FROM MEETINGS, WORK, OR FROM GETTING THINGS NEEDED FOR DAILY LIVING?: NO

## 2024-05-15 SDOH — ECONOMIC STABILITY: FOOD INSECURITY: WITHIN THE PAST 12 MONTHS, THE FOOD YOU BOUGHT JUST DIDN'T LAST AND YOU DIDN'T HAVE MONEY TO GET MORE.: NEVER TRUE

## 2024-05-15 SDOH — ECONOMIC STABILITY: INCOME INSECURITY: HOW HARD IS IT FOR YOU TO PAY FOR THE VERY BASICS LIKE FOOD, HOUSING, MEDICAL CARE, AND HEATING?: NOT HARD AT ALL

## 2024-05-15 SDOH — ECONOMIC STABILITY: FOOD INSECURITY: WITHIN THE PAST 12 MONTHS, YOU WORRIED THAT YOUR FOOD WOULD RUN OUT BEFORE YOU GOT MONEY TO BUY MORE.: NEVER TRUE

## 2024-05-16 ENCOUNTER — TELEMEDICINE (OUTPATIENT)
Dept: FAMILY MEDICINE CLINIC | Facility: CLINIC | Age: 69
End: 2024-05-16
Payer: COMMERCIAL

## 2024-05-16 DIAGNOSIS — F41.9 ANXIETY: ICD-10-CM

## 2024-05-16 DIAGNOSIS — K21.00 GASTROESOPHAGEAL REFLUX DISEASE WITH ESOPHAGITIS WITHOUT HEMORRHAGE: Primary | ICD-10-CM

## 2024-05-16 PROCEDURE — 99442 PR PHYS/QHP TELEPHONE EVALUATION 11-20 MIN: CPT | Performed by: NURSE PRACTITIONER

## 2024-05-16 RX ORDER — VITAMIN E 268 MG
400 CAPSULE ORAL DAILY
COMMUNITY
Start: 2024-05-01

## 2024-05-16 RX ORDER — OMEPRAZOLE 20 MG/1
CAPSULE, DELAYED RELEASE ORAL
Qty: 120 CAPSULE | Refills: 3 | Status: SHIPPED | OUTPATIENT
Start: 2024-05-16

## 2024-05-16 ASSESSMENT — PATIENT HEALTH QUESTIONNAIRE - PHQ9
SUM OF ALL RESPONSES TO PHQ QUESTIONS 1-9: 0
2. FEELING DOWN, DEPRESSED OR HOPELESS: NOT AT ALL
1. LITTLE INTEREST OR PLEASURE IN DOING THINGS: NOT AT ALL
SUM OF ALL RESPONSES TO PHQ QUESTIONS 1-9: 0
SUM OF ALL RESPONSES TO PHQ9 QUESTIONS 1 & 2: 0
SUM OF ALL RESPONSES TO PHQ QUESTIONS 1-9: 0
SUM OF ALL RESPONSES TO PHQ QUESTIONS 1-9: 0

## 2024-05-16 NOTE — PROGRESS NOTES
She and/or health care decision maker is aware that that she may receive a bill for this telephone service, depending on her insurance coverage, and has provided verbal consent to proceed.    This is a Patient Initiated Episode with an Established Patient who has not had a related appointment within my department in the past 7 days or scheduled within the next 24 hours.        HISTORY OF PRESENTING ILLNESS      Denise Valadez is a 69 y.o. female evaluated via telephone on 5/16/2024  Patient unable to participate in Virtual Visit with synchronous audio/visual technology.         Diagnosis   1. Gastroesophageal reflux disease with esophagitis without hemorrhage    Patient relates she recently saw GI had esophageal dilation along with EGD and colonoscopy.  She states biopsy of esophagus revealed inflammation.  She goes on as she was told to start omeprazole by GI and request that I sent in a prescription for this.   2. Anxiety    Patient presents for follow-up after starting Celexa 5 mg once daily for anxiety.  She states that medication is working well with signs and symptoms.           PCP Provider  Erika Stiles, APRN - CNP  Past Medical History:   Diagnosis Date    GERD (gastroesophageal reflux disease) 2018    Hypothyroidism 2016    Thyroid disease       Past Surgical History:   Procedure Laterality Date    HYSTERECTOMY, VAGINAL  12/1998    Still have ovaries    TONSILLECTOMY  1971    TOTAL COLECTOMY      UPPER GASTROINTESTINAL ENDOSCOPY      Difficulty swallowing     Allergies   Allergen Reactions    Effexor [Venlafaxine] Other (See Comments)     Severe headaches    Metronidazole Diarrhea, Nausea And Vomiting and Nausea Only     Pt states she gets N/V and diarrhea with IV administration  Other reaction(s): gi distress  Pt states she gets N/V and diarrhea with IV administration      Family History   Problem Relation Age of Onset    Heart Attack Father     High Cholesterol Father     Lung Disease Mother

## 2024-05-16 NOTE — PROGRESS NOTES
Denise Valadez presents today for   Chief Complaint   Patient presents with    Follow-up    Anxiety         Depression Screenin/16/2024    10:54 AM 2024     9:39 AM 8/10/2023     1:17 PM 3/10/2023     8:10 AM 2022    11:49 AM 8/3/2022     4:27 PM 2022     3:36 PM   PHQ-9 Questionaire   Little interest or pleasure in doing things 0 0 0 0 0 0 0   Feeling down, depressed, or hopeless 0 0 0 1 0 0 0   PHQ-9 Total Score 0 0 0 1 0 0 0       Fall Risk      2024    10:54 AM 2024     9:39 AM 8/10/2023     1:17 PM 3/10/2023     8:10 AM   Fall Risk   2 or more falls in past year? no no no no   Fall with injury in past year? no no no no         Health Maintenance reviewed and discussed and ordered per Provider.    Health Maintenance Due   Topic Date Due    Lipids  Never done    Hepatitis C screen  Never done    Shingles vaccine (1 of 2) Never done    Respiratory Syncytial Virus (RSV) Pregnant or age 60 yrs+ (1 - 1-dose 60+ series) Never done    Pneumococcal 65+ years Vaccine (2 of 2 - PPSV23 or PCV20) 2020    DTaP/Tdap/Td vaccine (1 - Tdap) 2022    COVID-19 Vaccine (4 -  season) 2023   .      \"Have you been to the ER, urgent care clinic since your last visit?  Hospitalized since your last visit?\"    NO    “Have you seen or consulted any other health care providers outside of [facility] since your last visit?”    NO

## 2024-06-21 ENCOUNTER — TELEMEDICINE (OUTPATIENT)
Dept: FAMILY MEDICINE CLINIC | Facility: CLINIC | Age: 69
End: 2024-06-21
Payer: COMMERCIAL

## 2024-06-21 DIAGNOSIS — R14.0 BLOATING: Primary | ICD-10-CM

## 2024-06-21 PROCEDURE — 99443 PR PHYS/QHP TELEPHONE EVALUATION 21-30 MIN: CPT | Performed by: NURSE PRACTITIONER

## 2024-06-21 NOTE — PROGRESS NOTES
Denise WILLA Valadez presents today for   Chief Complaint   Patient presents with    Diarrhea    Nausea    Abdominal Pain         Depression Screenin/16/2024    10:54 AM 2024     9:39 AM 8/10/2023     1:17 PM 3/10/2023     8:10 AM 2022    11:49 AM 8/3/2022     4:27 PM 2022     3:36 PM   PHQ-9 Questionaire   Little interest or pleasure in doing things 0 0 0 0 0 0 0   Feeling down, depressed, or hopeless 0 0 0 1 0 0 0   PHQ-9 Total Score 0 0 0 1 0 0 0       Fall Risk      2024    10:54 AM 2024     9:39 AM 8/10/2023     1:17 PM 3/10/2023     8:10 AM   Fall Risk   2 or more falls in past year? no no no no   Fall with injury in past year? no no no no         Health Maintenance reviewed and discussed and ordered per Provider.    Health Maintenance Due   Topic Date Due    Lipids  Never done    Hepatitis C screen  Never done    Shingles vaccine (1 of 2) Never done    Respiratory Syncytial Virus (RSV) Pregnant or age 60 yrs+ (1 - 1-dose 60+ series) Never done    Pneumococcal 65+ years Vaccine (2 of 2 - PPSV23 or PCV20) 2020    DTaP/Tdap/Td vaccine (1 - Tdap) 2022    COVID-19 Vaccine (4 -  season) 2023   .      \"Have you been to the ER, urgent care clinic since your last visit?  Hospitalized since your last visit?\"    NO    “Have you seen or consulted any other health care providers outside of [facility] since your last visit?”    NO          
capsule by mouth daily    omeprazole (PRILOSEC) 20 MG delayed release capsule 1 tab po bid x 60 days then decrease to 1 tab po once daily thereafter    atorvastatin (LIPITOR) 40 MG tablet TAKE 1 TABLET DAILY    betamethasone dipropionate 0.05 % cream Apply topically 2 times daily    ARMOUR THYROID 15 MG tablet Take 3 tablets by mouth daily    citalopram (CELEXA) 10 MG tablet 1/ tab po once daily    estradiol (VIVELLE) 0.0375 MG/24HR     doxyLAMINE succinate (GNP SLEEP AID) 25 MG tablet Take 1 to 1.5 tabs nightly    Multiple Vitamin (MULTIVITAMIN ADULT PO) Take 1 tablet by mouth daily    Multiple Vitamins-Minerals (HAIR/SKIN/NAILS) TABS Take by mouth    vitamin D3 (CHOLECALCIFEROL) 125 MCG (5000 UT) TABS tablet Take by mouth every morning    estrogens conjugated (PREMARIN) 0.625 MG/GM CREA vaginal cream Premarin 0.625 mg/gram vaginal cream    tretinoin (RETIN-A) 0.1 % cream APPLY CREAM TO AFFECTED AREA ONCE EVERY NIGHT AT BEDTIME AS TOLERATED     No current facility-administered medications for this visit.       I have reviewed the nurses notes, vitals, problem list, allergy list, medical history, family, social history and medications.       REVIEW OF SYMPTOMS     General:  Pt is able to conduct ADL's   Respiratory: Denies cough, congestion, shortness of breath   Cardiovascular: Denies chest pain, palpitations, edema    Psychiatric: Denies confusion, insomnia, depression          PHYSICAL EXAM       Due to this being a telephone encounter a very limited exam was performed  Neurological: A&Ox3, no slurred speech, answering questions appropriately  Respiratory: Non labored, talking in complete sentences, no audible wheeze over the phone   Psychiatric: pleasant appropriate, oriented to person place time and situation     ASSESSMENT        Denise was seen today for diarrhea, nausea and abdominal pain.    Diagnoses and all orders for this visit:    Bloating  -     US GALLBLADDER RUQ; Future    Advised the patient that

## 2024-07-15 ENCOUNTER — TELEPHONE (OUTPATIENT)
Dept: FAMILY MEDICINE CLINIC | Facility: CLINIC | Age: 69
End: 2024-07-15

## 2024-07-15 DIAGNOSIS — E03.9 HYPOTHYROIDISM, UNSPECIFIED TYPE: ICD-10-CM

## 2024-07-15 RX ORDER — THYROID,PORK 15 MG
45 TABLET ORAL DAILY
Qty: 42 TABLET | Refills: 0 | Status: SHIPPED | OUTPATIENT
Start: 2024-07-15 | End: 2024-07-16 | Stop reason: SDUPTHER

## 2024-07-15 NOTE — TELEPHONE ENCOUNTER
Can you send a 2 wk supply of [place] Thyroid to [facility] please?  She's out because she needs a PA done.

## 2024-07-15 NOTE — TELEPHONE ENCOUNTER
Pt was wondering if [facility] notified you that she needed a PA for her [place] Thyroid?  They told her that it was .

## 2024-07-16 DIAGNOSIS — E03.9 HYPOTHYROIDISM, UNSPECIFIED TYPE: ICD-10-CM

## 2024-07-16 RX ORDER — THYROID,PORK 15 MG
45 TABLET ORAL DAILY
Qty: 90 TABLET | Refills: 3 | Status: SHIPPED | OUTPATIENT
Start: 2024-07-16

## 2024-07-16 NOTE — TELEPHONE ENCOUNTER
Can you send her 90 day supply to [facility]?  She picked up her small qty from [place] yesterday.

## 2024-07-20 DIAGNOSIS — F41.9 ANXIETY: ICD-10-CM

## 2024-07-22 RX ORDER — CITALOPRAM HYDROBROMIDE 10 MG/1
TABLET ORAL
Qty: 90 TABLET | Refills: 1 | Status: SHIPPED | OUTPATIENT
Start: 2024-07-22

## 2024-08-22 ENCOUNTER — TELEPHONE (OUTPATIENT)
Facility: CLINIC | Age: 69
End: 2024-08-22

## 2024-08-22 DIAGNOSIS — M62.08 DIASTASIS RECTI: Primary | ICD-10-CM

## 2024-08-22 NOTE — TELEPHONE ENCOUNTER
Please place referral for patient to the following:    [facility]  Dr. Humberto Tyler     Please add the following dx to the referral per their office:  Diastasis Recti   Umbilical Hernia     Patient is our of their 3 year window so she needs new referral.

## 2024-10-27 DIAGNOSIS — E03.9 HYPOTHYROIDISM, UNSPECIFIED TYPE: ICD-10-CM

## 2024-10-28 ENCOUNTER — TELEPHONE (OUTPATIENT)
Facility: CLINIC | Age: 69
End: 2024-10-28

## 2024-10-28 DIAGNOSIS — E03.9 HYPOTHYROIDISM, UNSPECIFIED TYPE: ICD-10-CM

## 2024-10-28 RX ORDER — THYROID,PORK 15 MG
15 TABLET ORAL DAILY
Qty: 90 TABLET | Refills: 3 | Status: SHIPPED | OUTPATIENT
Start: 2024-10-28 | End: 2024-10-29 | Stop reason: SDUPTHER

## 2024-10-28 RX ORDER — THYROID,PORK 15 MG
15 TABLET ORAL DAILY
Qty: 90 TABLET | Refills: 3 | Status: SHIPPED | OUTPATIENT
Start: 2024-10-28 | End: 2024-10-28

## 2024-10-28 RX ORDER — THYROID,PORK 15 MG
TABLET ORAL
Qty: 90 TABLET | Refills: 3 | Status: SHIPPED | OUTPATIENT
Start: 2024-10-28 | End: 2024-10-28 | Stop reason: SDUPTHER

## 2024-10-28 NOTE — TELEPHONE ENCOUNTER
Please cancel patients rx request for her Thyroid meds to [facility]     She needs this sent to [facility]

## 2024-10-29 ENCOUNTER — TELEPHONE (OUTPATIENT)
Facility: CLINIC | Age: 69
End: 2024-10-29

## 2024-10-29 DIAGNOSIS — E03.9 HYPOTHYROIDISM, UNSPECIFIED TYPE: ICD-10-CM

## 2024-10-29 RX ORDER — THYROID,PORK 15 MG
45 TABLET ORAL DAILY
Qty: 90 TABLET | Refills: 3 | Status: SHIPPED | OUTPATIENT
Start: 2024-10-29

## 2024-10-29 NOTE — TELEPHONE ENCOUNTER
Pt actually takes three tabs of [place] Thyroid 15 mg.  Could you send the Rx that has those instructions and delete the Rx that says 1 tab daily.    [facility].

## 2025-01-06 DIAGNOSIS — F41.9 ANXIETY: ICD-10-CM

## 2025-01-06 RX ORDER — CITALOPRAM HYDROBROMIDE 10 MG/1
TABLET ORAL
Qty: 90 TABLET | Refills: 1 | Status: SHIPPED | OUTPATIENT
Start: 2025-01-06

## 2025-02-20 DIAGNOSIS — K21.00 GASTROESOPHAGEAL REFLUX DISEASE WITH ESOPHAGITIS WITHOUT HEMORRHAGE: ICD-10-CM

## 2025-02-20 RX ORDER — OMEPRAZOLE 20 MG/1
CAPSULE, DELAYED RELEASE ORAL
Qty: 120 CAPSULE | Refills: 3 | Status: SHIPPED | OUTPATIENT
Start: 2025-02-20

## 2025-04-03 DIAGNOSIS — E03.9 HYPOTHYROIDISM, UNSPECIFIED TYPE: ICD-10-CM

## 2025-04-03 RX ORDER — THYROID,PORK 15 MG
TABLET ORAL
Qty: 90 TABLET | Refills: 3 | Status: SHIPPED | OUTPATIENT
Start: 2025-04-03 | End: 2025-04-03 | Stop reason: SDUPTHER

## 2025-04-03 RX ORDER — THYROID,PORK 15 MG
45 TABLET ORAL DAILY
Qty: 270 TABLET | Refills: 3 | Status: SHIPPED | OUTPATIENT
Start: 2025-04-03

## 2025-05-06 ENCOUNTER — CLINICAL SUPPORT (OUTPATIENT)
Dept: FAMILY MEDICINE CLINIC | Facility: CLINIC | Age: 70
End: 2025-05-06
Payer: COMMERCIAL

## 2025-05-06 DIAGNOSIS — E55.9 VITAMIN D DEFICIENCY, UNSPECIFIED: ICD-10-CM

## 2025-05-06 DIAGNOSIS — E78.2 MIXED HYPERLIPIDEMIA: ICD-10-CM

## 2025-05-06 DIAGNOSIS — E53.8 DEFICIENCY OF OTHER SPECIFIED B GROUP VITAMINS: ICD-10-CM

## 2025-05-06 DIAGNOSIS — Z11.59 NEED FOR HEPATITIS C SCREENING TEST: ICD-10-CM

## 2025-05-06 DIAGNOSIS — Z00.00 WELLNESS EXAMINATION: Primary | ICD-10-CM

## 2025-05-06 DIAGNOSIS — E03.9 HYPOTHYROIDISM, UNSPECIFIED TYPE: ICD-10-CM

## 2025-05-06 PROCEDURE — 36415 COLL VENOUS BLD VENIPUNCTURE: CPT | Performed by: NURSE PRACTITIONER

## 2025-05-06 NOTE — PROGRESS NOTES
Verbal order from provider Erika Stiles to draw labs in office    Patient presents for lab draw ordered by:    Ordering Provider:  Erika Stiles  Ordering Department/Practice:  [facility]  Phone:  [phone]  Date Ordered:  5/6/25    Labs were drawn and sent to [facility] by Anne Rodríguez LPN:    The following tubes were sent:    Gold  ( 2) and Lavender  ( 1)    Draw site right brachial.  Patient tolerated draw with no distress.

## 2025-05-07 LAB
25(OH)D3+25(OH)D2 SERPL-MCNC: 54 NG/ML (ref 30–100)
ALBUMIN SERPL-MCNC: 4.4 G/DL (ref 3.8–4.8)
ALP SERPL-CCNC: 81 IU/L (ref 44–121)
ALT SERPL-CCNC: 20 IU/L (ref 0–32)
AST SERPL-CCNC: 21 IU/L (ref 0–40)
BASOPHILS # BLD AUTO: 0 X10E3/UL (ref 0–0.2)
BASOPHILS NFR BLD AUTO: 1 %
BILIRUB SERPL-MCNC: 0.3 MG/DL (ref 0–1.2)
BUN SERPL-MCNC: 15 MG/DL (ref 8–27)
BUN/CREAT SERPL: 24 (ref 12–28)
CALCIUM SERPL-MCNC: 9.1 MG/DL (ref 8.7–10.3)
CHLORIDE SERPL-SCNC: 104 MMOL/L (ref 96–106)
CHOLEST SERPL-MCNC: 208 MG/DL (ref 100–199)
CO2 SERPL-SCNC: 19 MMOL/L (ref 20–29)
CREAT SERPL-MCNC: 0.62 MG/DL (ref 0.57–1)
EGFRCR SERPLBLD CKD-EPI 2021: 95 ML/MIN/1.73
EOSINOPHIL # BLD AUTO: 0.3 X10E3/UL (ref 0–0.4)
EOSINOPHIL NFR BLD AUTO: 4 %
ERYTHROCYTE [DISTWIDTH] IN BLOOD BY AUTOMATED COUNT: 13.7 % (ref 11.7–15.4)
GLOBULIN SER CALC-MCNC: 2.4 G/DL (ref 1.5–4.5)
GLUCOSE SERPL-MCNC: 83 MG/DL (ref 70–99)
HCT VFR BLD AUTO: 40.3 % (ref 34–46.6)
HCV IGG SERPL QL IA: NON REACTIVE
HDLC SERPL-MCNC: 83 MG/DL
HGB BLD-MCNC: 13.5 G/DL (ref 11.1–15.9)
IMM GRANULOCYTES # BLD AUTO: 0 X10E3/UL (ref 0–0.1)
IMM GRANULOCYTES NFR BLD AUTO: 0 %
LDLC SERPL CALC-MCNC: 107 MG/DL (ref 0–99)
LYMPHOCYTES # BLD AUTO: 2.5 X10E3/UL (ref 0.7–3.1)
LYMPHOCYTES NFR BLD AUTO: 39 %
MCH RBC QN AUTO: 30.6 PG (ref 26.6–33)
MCHC RBC AUTO-ENTMCNC: 33.5 G/DL (ref 31.5–35.7)
MCV RBC AUTO: 91 FL (ref 79–97)
MONOCYTES # BLD AUTO: 0.5 X10E3/UL (ref 0.1–0.9)
MONOCYTES NFR BLD AUTO: 7 %
NEUTROPHILS # BLD AUTO: 3.1 X10E3/UL (ref 1.4–7)
NEUTROPHILS NFR BLD AUTO: 49 %
PLATELET # BLD AUTO: 322 X10E3/UL (ref 150–450)
POTASSIUM SERPL-SCNC: 4.4 MMOL/L (ref 3.5–5.2)
PROT SERPL-MCNC: 6.8 G/DL (ref 6–8.5)
RBC # BLD AUTO: 4.41 X10E6/UL (ref 3.77–5.28)
SODIUM SERPL-SCNC: 139 MMOL/L (ref 134–144)
SPECIMEN STATUS REPORT: NORMAL
TRIGL SERPL-MCNC: 103 MG/DL (ref 0–149)
TSH SERPL DL<=0.005 MIU/L-ACNC: 1.63 UIU/ML (ref 0.45–4.5)
VIT B12 SERPL-MCNC: 440 PG/ML (ref 232–1245)
VLDLC SERPL CALC-MCNC: 18 MG/DL (ref 5–40)
WBC # BLD AUTO: 6.3 X10E3/UL (ref 3.4–10.8)

## 2025-05-08 ENCOUNTER — RESULTS FOLLOW-UP (OUTPATIENT)
Dept: FAMILY MEDICINE CLINIC | Facility: CLINIC | Age: 70
End: 2025-05-08

## 2025-05-12 ENCOUNTER — OFFICE VISIT (OUTPATIENT)
Dept: FAMILY MEDICINE CLINIC | Facility: CLINIC | Age: 70
End: 2025-05-12

## 2025-05-12 VITALS
TEMPERATURE: 97.7 F | DIASTOLIC BLOOD PRESSURE: 74 MMHG | HEART RATE: 70 BPM | WEIGHT: 112 LBS | HEIGHT: 61 IN | SYSTOLIC BLOOD PRESSURE: 110 MMHG | BODY MASS INDEX: 21.14 KG/M2 | RESPIRATION RATE: 18 BRPM | OXYGEN SATURATION: 98 %

## 2025-05-12 DIAGNOSIS — E78.2 MIXED HYPERLIPIDEMIA: ICD-10-CM

## 2025-05-12 DIAGNOSIS — E53.8 DEFICIENCY OF OTHER SPECIFIED B GROUP VITAMINS: ICD-10-CM

## 2025-05-12 DIAGNOSIS — Z12.31 ENCOUNTER FOR SCREENING MAMMOGRAM FOR MALIGNANT NEOPLASM OF BREAST: Primary | ICD-10-CM

## 2025-05-12 DIAGNOSIS — K21.00 GASTROESOPHAGEAL REFLUX DISEASE WITH ESOPHAGITIS WITHOUT HEMORRHAGE: ICD-10-CM

## 2025-05-12 DIAGNOSIS — R07.9 CHEST PAIN, UNSPECIFIED TYPE: ICD-10-CM

## 2025-05-12 DIAGNOSIS — Z98.890 HISTORY OF ABDOMINOPLASTY: ICD-10-CM

## 2025-05-12 DIAGNOSIS — E55.9 VITAMIN D DEFICIENCY: ICD-10-CM

## 2025-05-12 RX ORDER — CALCIUM CARBONATE 500(1250)
500 TABLET ORAL DAILY
COMMUNITY

## 2025-05-12 SDOH — ECONOMIC STABILITY: FOOD INSECURITY: WITHIN THE PAST 12 MONTHS, THE FOOD YOU BOUGHT JUST DIDN'T LAST AND YOU DIDN'T HAVE MONEY TO GET MORE.: NEVER TRUE

## 2025-05-12 SDOH — ECONOMIC STABILITY: FOOD INSECURITY: WITHIN THE PAST 12 MONTHS, YOU WORRIED THAT YOUR FOOD WOULD RUN OUT BEFORE YOU GOT MONEY TO BUY MORE.: NEVER TRUE

## 2025-05-12 SDOH — ECONOMIC STABILITY: INCOME INSECURITY: IN THE LAST 12 MONTHS, WAS THERE A TIME WHEN YOU WERE NOT ABLE TO PAY THE MORTGAGE OR RENT ON TIME?: NO

## 2025-05-12 SDOH — ECONOMIC STABILITY: TRANSPORTATION INSECURITY
IN THE PAST 12 MONTHS, HAS THE LACK OF TRANSPORTATION KEPT YOU FROM MEDICAL APPOINTMENTS OR FROM GETTING MEDICATIONS?: NO

## 2025-05-12 ASSESSMENT — PATIENT HEALTH QUESTIONNAIRE - PHQ9
1. LITTLE INTEREST OR PLEASURE IN DOING THINGS: NOT AT ALL
SUM OF ALL RESPONSES TO PHQ QUESTIONS 1-9: 0
2. FEELING DOWN, DEPRESSED OR HOPELESS: NOT AT ALL

## 2025-05-12 ASSESSMENT — ENCOUNTER SYMPTOMS
CHEST TIGHTNESS: 0
SHORTNESS OF BREATH: 0

## 2025-05-12 NOTE — PROGRESS NOTES
Denise Valadez presents today for   Chief Complaint   Patient presents with    Annual Exam       Is someone accompanying this pt? no    Is the patient using any DME equipment during OV? no    Depression Screenin/16/2024    10:54 AM 2024     9:39 AM 8/10/2023     1:17 PM 3/10/2023     8:10 AM 2022    11:49 AM 8/3/2022     4:27 PM 2022     3:36 PM   PHQ-9 Questionaire   Little interest or pleasure in doing things 0 0 0 0 0 0 0   Feeling down, depressed, or hopeless 0 0 0 1 0 0 0   PHQ-9 Total Score 0 0 0 1 0 0 0       Fall Risk      2024    10:54 AM 2024     9:39 AM 8/10/2023     1:17 PM 3/10/2023     8:10 AM   Fall Risk   Do you feel unsteady or are you worried about falling?  no no no no   2 or more falls in past year? no no no no   Fall with injury in past year? no no no no        Health Maintenance reviewed and discussed and ordered per Provider.    Health Maintenance Due   Topic Date Due    Shingles vaccine (1 of 2) Never done    Pneumococcal 50+ years Vaccine (2 of 2 - PPSV23) 2020    DTaP/Tdap/Td vaccine (1 - Tdap) 2022    COVID-19 Vaccine (4 -  season) 2024    Depression Screen  2025   .        \"Have you been to the ER, urgent care clinic since your last visit?  Hospitalized since your last visit?\"    NO    “Have you seen or consulted any other health care providers outside our system since your last visit?”    NO    NO        Click Here for Release of Records Request

## 2025-06-13 RX ORDER — BETAMETHASONE DIPROPIONATE 0.5 MG/G
CREAM TOPICAL
Qty: 45 G | Refills: 0 | Status: SHIPPED | OUTPATIENT
Start: 2025-06-13

## 2025-07-30 ENCOUNTER — HOSPITAL ENCOUNTER (EMERGENCY)
Age: 70
Discharge: HOME | End: 2025-07-30
Attending: STUDENT IN AN ORGANIZED HEALTH CARE EDUCATION/TRAINING PROGRAM
Payer: COMMERCIAL

## 2025-07-30 ENCOUNTER — APPOINTMENT (OUTPATIENT)
Age: 70
End: 2025-07-30
Payer: COMMERCIAL

## 2025-07-30 VITALS
TEMPERATURE: 98.7 F | DIASTOLIC BLOOD PRESSURE: 65 MMHG | RESPIRATION RATE: 16 BRPM | SYSTOLIC BLOOD PRESSURE: 127 MMHG | BODY MASS INDEX: 21.14 KG/M2 | HEART RATE: 82 BPM | HEIGHT: 61 IN | WEIGHT: 112 LBS | OXYGEN SATURATION: 98 %

## 2025-07-30 DIAGNOSIS — J18.9 PNEUMONIA OF RIGHT LUNG DUE TO INFECTIOUS ORGANISM, UNSPECIFIED PART OF LUNG: Primary | ICD-10-CM

## 2025-07-30 LAB
ANION GAP SERPL CALC-SCNC: 13 MMOL/L
BASOPHILS # BLD: 0.03 K/UL (ref 0–0.1)
BASOPHILS NFR BLD: 0.4 % (ref 0–2)
BUN SERPL-MCNC: 9 MG/DL (ref 6–23)
BUN/CREAT SERPL: 17
CA-I BLD-MCNC: 9.4 MG/DL (ref 8.5–10.1)
CHLORIDE SERPL-SCNC: 102 MMOL/L (ref 98–107)
CO2 SERPL-SCNC: 21 MMOL/L (ref 21–32)
CREAT SERPL-MCNC: 0.53 MG/DL (ref 0.6–1.3)
DIFFERENTIAL METHOD BLD: ABNORMAL
EOSINOPHIL # BLD: 0.11 K/UL (ref 0–0.4)
EOSINOPHIL NFR BLD: 1.3 % (ref 0–5)
ERYTHROCYTE [DISTWIDTH] IN BLOOD BY AUTOMATED COUNT: 13.1 % (ref 11.6–14.5)
GLUCOSE SERPL-MCNC: 95 MG/DL (ref 74–108)
HCT VFR BLD AUTO: 39.6 % (ref 35–45)
HGB BLD-MCNC: 13.2 G/DL (ref 12–16)
IMM GRANULOCYTES # BLD AUTO: 0.03 K/UL (ref 0–0.04)
IMM GRANULOCYTES NFR BLD AUTO: 0.4 % (ref 0–0.5)
LACTATE SERPL-SCNC: 1.2 MMOL/L (ref 0.4–2)
LYMPHOCYTES # BLD: 1.51 K/UL (ref 0.9–3.6)
LYMPHOCYTES NFR BLD: 18.5 % (ref 21–52)
MCH RBC QN AUTO: 30.7 PG (ref 24–34)
MCHC RBC AUTO-ENTMCNC: 33.3 G/DL (ref 31–37)
MCV RBC AUTO: 92.1 FL (ref 78–100)
MONOCYTES # BLD: 0.66 K/UL (ref 0.05–1.2)
MONOCYTES NFR BLD: 8.1 % (ref 3–10)
NEUTS SEG # BLD: 5.84 K/UL (ref 1.8–8)
NEUTS SEG NFR BLD: 71.3 % (ref 40–73)
NRBC # BLD: 0 K/UL (ref 0–0.01)
NRBC BLD-RTO: 0 PER 100 WBC
PLATELET # BLD AUTO: 304 K/UL (ref 135–420)
PMV BLD AUTO: 9.4 FL (ref 9.2–11.8)
POTASSIUM SERPL-SCNC: 3.9 MMOL/L (ref 3.5–5.5)
RBC # BLD AUTO: 4.3 M/UL (ref 4.2–5.3)
SODIUM SERPL-SCNC: 136 MMOL/L (ref 136–145)
WBC # BLD AUTO: 8.2 K/UL (ref 4.6–13.2)

## 2025-07-30 PROCEDURE — 80048 BASIC METABOLIC PNL TOTAL CA: CPT

## 2025-07-30 PROCEDURE — 99284 EMERGENCY DEPT VISIT MOD MDM: CPT

## 2025-07-30 PROCEDURE — 83605 ASSAY OF LACTIC ACID: CPT

## 2025-07-30 PROCEDURE — 6370000000 HC RX 637 (ALT 250 FOR IP): Performed by: STUDENT IN AN ORGANIZED HEALTH CARE EDUCATION/TRAINING PROGRAM

## 2025-07-30 PROCEDURE — 85025 COMPLETE CBC W/AUTO DIFF WBC: CPT

## 2025-07-30 PROCEDURE — 71046 X-RAY EXAM CHEST 2 VIEWS: CPT

## 2025-07-30 RX ORDER — AZITHROMYCIN 250 MG/1
500 TABLET, FILM COATED ORAL
Status: COMPLETED | OUTPATIENT
Start: 2025-07-30 | End: 2025-07-30

## 2025-07-30 RX ORDER — AZITHROMYCIN 250 MG/1
250 TABLET, FILM COATED ORAL DAILY
Qty: 4 TABLET | Refills: 0 | Status: SHIPPED | OUTPATIENT
Start: 2025-07-31 | End: 2025-08-04

## 2025-07-30 RX ADMIN — AMOXICILLIN AND CLAVULANATE POTASSIUM 1 TABLET: 875; 125 TABLET, FILM COATED ORAL at 15:43

## 2025-07-30 RX ADMIN — AZITHROMYCIN 500 MG: 250 TABLET, FILM COATED ORAL at 15:43

## 2025-07-30 ASSESSMENT — ENCOUNTER SYMPTOMS
SHORTNESS OF BREATH: 0
NAUSEA: 0
CHEST TIGHTNESS: 0
ABDOMINAL PAIN: 0
EYE PAIN: 0
BACK PAIN: 0
DIARRHEA: 0
VOMITING: 0
COUGH: 1

## 2025-07-30 ASSESSMENT — LIFESTYLE VARIABLES
HOW OFTEN DO YOU HAVE A DRINK CONTAINING ALCOHOL: 2-4 TIMES A MONTH
HOW MANY STANDARD DRINKS CONTAINING ALCOHOL DO YOU HAVE ON A TYPICAL DAY: 1 OR 2

## 2025-07-30 ASSESSMENT — PAIN - FUNCTIONAL ASSESSMENT: PAIN_FUNCTIONAL_ASSESSMENT: NONE - DENIES PAIN

## 2025-07-30 NOTE — ED TRIAGE NOTES
Patient c/o fever, chills, cough since Sunday. States fever got up to 102.6. States she took a home covid/flu test and it was negative. Reports taking Ibuprofen, Cold & Flu and Airborne for symptoms.

## 2025-07-30 NOTE — ED PROVIDER NOTES
[facility] EMERGENCY DEPT  EMERGENCY DEPARTMENT HISTORY AND PHYSICAL EXAM      Date of evaluation: 7/30/2025  Patient Name: Denise Valadez  Birthdate 1955  MRN: [id number]  ED Provider: Lloyd Poon MD   Note Started: 1:45 PM EDT 7/30/25    HISTORY OF PRESENT ILLNESS     Chief Complaint   Patient presents with    Cough    Fever       History Provided By: Patient, only     HPI: Denise Valadez is a 70 y.o. female with history of hypothyroidism here for evaluation of cough and congestion.  Patient reports cough and congestion for several days.  Has also been experiencing intermittent fevers primarily at night.  No chest pain or shortness of breath.  Was unable to follow-up with her PCP so came here for evaluation.  Took an at home flu COVID test which was negative.  She last took Tylenol and/or Motrin yesterday evening  REVIEW OF SYSTEMS   Review of Systems   Constitutional:  Positive for chills and fever.   HENT:  Negative for congestion.    Eyes:  Negative for pain and visual disturbance.   Respiratory:  Positive for cough. Negative for chest tightness and shortness of breath.    Cardiovascular:  Negative for chest pain.   Gastrointestinal:  Negative for abdominal pain, diarrhea, nausea and vomiting.   Genitourinary:  Negative for dysuria and frequency.   Musculoskeletal:  Negative for back pain.   Neurological:  Negative for weakness and numbness.   Psychiatric/Behavioral:  Negative for confusion.         Positives and Pertinent negatives as per HPI.    PAST MEDICAL HISTORY   Past Medical History:  Past Medical History:   Diagnosis Date    GERD (gastroesophageal reflux disease) 2018    Hypothyroidism 2016    Liver disease     Hepatic Steatosis    Thyroid disease        Past Surgical History:  Past Surgical History:   Procedure Laterality Date    COLONOSCOPY  4-24    HYSTERECTOMY, VAGINAL  12/1998    Still have ovaries    TONSILLECTOMY  1971    TOTAL COLECTOMY      UMBILICAL HERNIA REPAIR      [facility]

## 2025-07-31 ENCOUNTER — TELEPHONE (OUTPATIENT)
Dept: FAMILY MEDICINE CLINIC | Facility: CLINIC | Age: 70
End: 2025-07-31

## 2025-07-31 RX ORDER — BENZONATATE 100 MG/1
100 CAPSULE ORAL 3 TIMES DAILY PRN
Qty: 30 CAPSULE | Refills: 0 | Status: SHIPPED | OUTPATIENT
Start: 2025-07-31 | End: 2025-08-10

## 2025-07-31 NOTE — TELEPHONE ENCOUNTER
Patient was diagnosed with pneumonia in the ER yesterday. She said her cough is out of control and it's making her abdomen sore where she had hernia repairs done last December. She has tried otc cough and cold meds, but they do not help. Patient would like to know if something else can be recommended or prescribed for her?  
no